# Patient Record
Sex: MALE | Race: WHITE | NOT HISPANIC OR LATINO
[De-identification: names, ages, dates, MRNs, and addresses within clinical notes are randomized per-mention and may not be internally consistent; named-entity substitution may affect disease eponyms.]

---

## 2018-04-16 PROBLEM — Z00.00 ENCOUNTER FOR PREVENTIVE HEALTH EXAMINATION: Status: ACTIVE | Noted: 2018-04-16

## 2018-04-30 ENCOUNTER — APPOINTMENT (OUTPATIENT)
Dept: SURGICAL ONCOLOGY | Facility: CLINIC | Age: 58
End: 2018-04-30
Payer: COMMERCIAL

## 2018-04-30 VITALS
WEIGHT: 241 LBS | HEART RATE: 69 BPM | SYSTOLIC BLOOD PRESSURE: 126 MMHG | BODY MASS INDEX: 34.5 KG/M2 | RESPIRATION RATE: 15 BRPM | OXYGEN SATURATION: 95 % | HEIGHT: 70 IN | DIASTOLIC BLOOD PRESSURE: 74 MMHG

## 2018-04-30 DIAGNOSIS — Z80.41 FAMILY HISTORY OF MALIGNANT NEOPLASM OF OVARY: ICD-10-CM

## 2018-04-30 DIAGNOSIS — Z78.9 OTHER SPECIFIED HEALTH STATUS: ICD-10-CM

## 2018-04-30 PROCEDURE — 99243 OFF/OP CNSLTJ NEW/EST LOW 30: CPT

## 2018-05-01 ENCOUNTER — FORM ENCOUNTER (OUTPATIENT)
Age: 58
End: 2018-05-01

## 2018-05-01 ENCOUNTER — TRANSCRIPTION ENCOUNTER (OUTPATIENT)
Age: 58
End: 2018-05-01

## 2018-05-02 ENCOUNTER — OUTPATIENT (OUTPATIENT)
Dept: OUTPATIENT SERVICES | Facility: HOSPITAL | Age: 58
LOS: 1 days | End: 2018-05-02
Payer: COMMERCIAL

## 2018-05-02 ENCOUNTER — APPOINTMENT (OUTPATIENT)
Dept: CT IMAGING | Facility: CLINIC | Age: 58
End: 2018-05-02
Payer: COMMERCIAL

## 2018-05-02 DIAGNOSIS — C43.59 MALIGNANT MELANOMA OF OTHER PART OF TRUNK: ICD-10-CM

## 2018-05-02 PROCEDURE — 74177 CT ABD & PELVIS W/CONTRAST: CPT | Mod: 26

## 2018-05-02 PROCEDURE — 74177 CT ABD & PELVIS W/CONTRAST: CPT

## 2018-05-02 PROCEDURE — 71260 CT THORAX DX C+: CPT

## 2018-05-02 PROCEDURE — 71260 CT THORAX DX C+: CPT | Mod: 26

## 2018-05-04 ENCOUNTER — RESULT REVIEW (OUTPATIENT)
Age: 58
End: 2018-05-04

## 2018-05-14 ENCOUNTER — OUTPATIENT (OUTPATIENT)
Dept: OUTPATIENT SERVICES | Facility: HOSPITAL | Age: 58
LOS: 1 days | End: 2018-05-14
Payer: COMMERCIAL

## 2018-05-14 DIAGNOSIS — Z01.818 ENCOUNTER FOR OTHER PREPROCEDURAL EXAMINATION: ICD-10-CM

## 2018-05-14 DIAGNOSIS — C43.59 MALIGNANT MELANOMA OF OTHER PART OF TRUNK: ICD-10-CM

## 2018-05-14 PROCEDURE — 93010 ELECTROCARDIOGRAM REPORT: CPT | Mod: NC

## 2018-05-14 PROCEDURE — 93005 ELECTROCARDIOGRAM TRACING: CPT

## 2018-05-14 PROCEDURE — G0463: CPT

## 2018-05-14 PROCEDURE — 85025 COMPLETE CBC W/AUTO DIFF WBC: CPT

## 2018-05-14 PROCEDURE — 80048 BASIC METABOLIC PNL TOTAL CA: CPT

## 2018-05-14 PROCEDURE — 36415 COLL VENOUS BLD VENIPUNCTURE: CPT

## 2018-05-14 PROCEDURE — 83615 LACTATE (LD) (LDH) ENZYME: CPT

## 2018-05-15 ENCOUNTER — APPOINTMENT (OUTPATIENT)
Dept: SURGICAL ONCOLOGY | Facility: CLINIC | Age: 58
End: 2018-05-15
Payer: COMMERCIAL

## 2018-05-15 ENCOUNTER — FORM ENCOUNTER (OUTPATIENT)
Age: 58
End: 2018-05-15

## 2018-05-15 VITALS
WEIGHT: 241 LBS | BODY MASS INDEX: 34.5 KG/M2 | SYSTOLIC BLOOD PRESSURE: 133 MMHG | RESPIRATION RATE: 15 BRPM | HEIGHT: 70 IN | HEART RATE: 71 BPM | DIASTOLIC BLOOD PRESSURE: 81 MMHG

## 2018-05-15 PROCEDURE — 99214 OFFICE O/P EST MOD 30 MIN: CPT

## 2018-05-15 RX ORDER — CEPHALEXIN 500 MG/1
500 CAPSULE ORAL EVERY 8 HOURS
Qty: 30 | Refills: 0 | Status: ACTIVE | COMMUNITY
Start: 2018-05-15 | End: 1900-01-01

## 2018-05-16 ENCOUNTER — APPOINTMENT (OUTPATIENT)
Dept: SURGICAL ONCOLOGY | Facility: HOSPITAL | Age: 58
End: 2018-05-16

## 2018-05-16 ENCOUNTER — RESULT REVIEW (OUTPATIENT)
Age: 58
End: 2018-05-16

## 2018-05-16 ENCOUNTER — OUTPATIENT (OUTPATIENT)
Dept: OUTPATIENT SERVICES | Facility: HOSPITAL | Age: 58
LOS: 1 days | End: 2018-05-16
Payer: COMMERCIAL

## 2018-05-16 DIAGNOSIS — C43.59 MALIGNANT MELANOMA OF OTHER PART OF TRUNK: ICD-10-CM

## 2018-05-16 PROCEDURE — 88305 TISSUE EXAM BY PATHOLOGIST: CPT | Mod: 26

## 2018-05-16 PROCEDURE — 38792 RA TRACER ID OF SENTINL NODE: CPT

## 2018-05-16 PROCEDURE — 14301 TIS TRNFR ANY 30.1-60 SQ CM: CPT

## 2018-05-16 PROCEDURE — 14302 TIS TRNFR ADDL 30 SQ CM: CPT

## 2018-05-16 PROCEDURE — 78195 LYMPH SYSTEM IMAGING: CPT | Mod: 26

## 2018-05-16 PROCEDURE — 11606 EXC TR-EXT MAL+MARG >4 CM: CPT | Mod: 59

## 2018-05-17 PROCEDURE — 78195 LYMPH SYSTEM IMAGING: CPT

## 2018-05-17 PROCEDURE — 88305 TISSUE EXAM BY PATHOLOGIST: CPT

## 2018-05-17 PROCEDURE — 14001 TIS TRNFR TRUNK 10.1-30SQCM: CPT

## 2018-05-17 PROCEDURE — A9541: CPT

## 2018-07-11 ENCOUNTER — APPOINTMENT (OUTPATIENT)
Dept: SURGICAL ONCOLOGY | Facility: CLINIC | Age: 58
End: 2018-07-11
Payer: COMMERCIAL

## 2018-07-11 VITALS
BODY MASS INDEX: 34.36 KG/M2 | DIASTOLIC BLOOD PRESSURE: 74 MMHG | WEIGHT: 240 LBS | OXYGEN SATURATION: 95 % | HEART RATE: 71 BPM | SYSTOLIC BLOOD PRESSURE: 119 MMHG | HEIGHT: 70 IN

## 2018-07-11 PROCEDURE — 99024 POSTOP FOLLOW-UP VISIT: CPT

## 2018-07-11 RX ORDER — DOXYCYCLINE 100 MG/1
100 TABLET, FILM COATED ORAL
Qty: 20 | Refills: 0 | Status: ACTIVE | COMMUNITY
Start: 2018-06-25

## 2018-07-11 RX ORDER — COLLAGENASE SANTYL 250 [ARB'U]/G
250 OINTMENT TOPICAL
Qty: 30 | Refills: 0 | Status: ACTIVE | COMMUNITY
Start: 2018-07-03

## 2018-07-11 RX ORDER — AMOXICILLIN AND CLAVULANATE POTASSIUM 875; 125 MG/1; MG/1
875-125 TABLET, COATED ORAL
Qty: 30 | Refills: 0 | Status: ACTIVE | COMMUNITY
Start: 2018-07-02

## 2018-10-10 ENCOUNTER — FORM ENCOUNTER (OUTPATIENT)
Age: 58
End: 2018-10-10

## 2018-10-11 ENCOUNTER — OUTPATIENT (OUTPATIENT)
Dept: OUTPATIENT SERVICES | Facility: HOSPITAL | Age: 58
LOS: 1 days | End: 2018-10-11
Payer: COMMERCIAL

## 2018-10-11 ENCOUNTER — APPOINTMENT (OUTPATIENT)
Dept: ULTRASOUND IMAGING | Facility: CLINIC | Age: 58
End: 2018-10-11
Payer: COMMERCIAL

## 2018-10-11 DIAGNOSIS — C43.59 MALIGNANT MELANOMA OF OTHER PART OF TRUNK: ICD-10-CM

## 2018-10-11 PROCEDURE — 76882 US LMTD JT/FCL EVL NVASC XTR: CPT

## 2018-10-11 PROCEDURE — 76882 US LMTD JT/FCL EVL NVASC XTR: CPT | Mod: 26,RT

## 2018-10-26 ENCOUNTER — OUTPATIENT (OUTPATIENT)
Dept: OUTPATIENT SERVICES | Facility: HOSPITAL | Age: 58
LOS: 1 days | End: 2018-10-26
Payer: COMMERCIAL

## 2018-10-26 VITALS
HEART RATE: 67 BPM | DIASTOLIC BLOOD PRESSURE: 86 MMHG | HEIGHT: 70 IN | WEIGHT: 244.93 LBS | SYSTOLIC BLOOD PRESSURE: 141 MMHG | TEMPERATURE: 98 F | OXYGEN SATURATION: 97 % | RESPIRATION RATE: 16 BRPM

## 2018-10-26 DIAGNOSIS — R22.1 LOCALIZED SWELLING, MASS AND LUMP, NECK: ICD-10-CM

## 2018-10-26 DIAGNOSIS — C43.9 MALIGNANT MELANOMA OF SKIN, UNSPECIFIED: Chronic | ICD-10-CM

## 2018-10-26 DIAGNOSIS — Z90.49 ACQUIRED ABSENCE OF OTHER SPECIFIED PARTS OF DIGESTIVE TRACT: Chronic | ICD-10-CM

## 2018-10-26 DIAGNOSIS — Z01.818 ENCOUNTER FOR OTHER PREPROCEDURAL EXAMINATION: ICD-10-CM

## 2018-10-26 DIAGNOSIS — M21.70 UNEQUAL LIMB LENGTH (ACQUIRED), UNSPECIFIED SITE: Chronic | ICD-10-CM

## 2018-10-26 LAB
ANION GAP SERPL CALC-SCNC: 11 MMOL/L — SIGNIFICANT CHANGE UP (ref 5–17)
BUN SERPL-MCNC: 16 MG/DL — SIGNIFICANT CHANGE UP (ref 7–23)
CALCIUM SERPL-MCNC: 9.6 MG/DL — SIGNIFICANT CHANGE UP (ref 8.4–10.5)
CHLORIDE SERPL-SCNC: 102 MMOL/L — SIGNIFICANT CHANGE UP (ref 96–108)
CO2 SERPL-SCNC: 26 MMOL/L — SIGNIFICANT CHANGE UP (ref 22–31)
CREAT SERPL-MCNC: 0.7 MG/DL — SIGNIFICANT CHANGE UP (ref 0.5–1.3)
GLUCOSE SERPL-MCNC: 92 MG/DL — SIGNIFICANT CHANGE UP (ref 70–99)
HCT VFR BLD CALC: 45.9 % — SIGNIFICANT CHANGE UP (ref 39–50)
HGB BLD-MCNC: 15.9 G/DL — SIGNIFICANT CHANGE UP (ref 13–17)
MCHC RBC-ENTMCNC: 30.5 PG — SIGNIFICANT CHANGE UP (ref 27–34)
MCHC RBC-ENTMCNC: 34.6 GM/DL — SIGNIFICANT CHANGE UP (ref 32–36)
MCV RBC AUTO: 87.9 FL — SIGNIFICANT CHANGE UP (ref 80–100)
PLATELET # BLD AUTO: 284 K/UL — SIGNIFICANT CHANGE UP (ref 150–400)
POTASSIUM SERPL-MCNC: 4.1 MMOL/L — SIGNIFICANT CHANGE UP (ref 3.5–5.3)
POTASSIUM SERPL-SCNC: 4.1 MMOL/L — SIGNIFICANT CHANGE UP (ref 3.5–5.3)
RBC # BLD: 5.22 M/UL — SIGNIFICANT CHANGE UP (ref 4.2–5.8)
RBC # FLD: 13.3 % — SIGNIFICANT CHANGE UP (ref 10.3–14.5)
SODIUM SERPL-SCNC: 139 MMOL/L — SIGNIFICANT CHANGE UP (ref 135–145)
WBC # BLD: 6.11 K/UL — SIGNIFICANT CHANGE UP (ref 3.8–10.5)
WBC # FLD AUTO: 6.11 K/UL — SIGNIFICANT CHANGE UP (ref 3.8–10.5)

## 2018-10-26 PROCEDURE — 85027 COMPLETE CBC AUTOMATED: CPT

## 2018-10-26 PROCEDURE — G0463: CPT

## 2018-10-26 PROCEDURE — 80048 BASIC METABOLIC PNL TOTAL CA: CPT

## 2018-10-26 RX ORDER — SODIUM CHLORIDE 9 MG/ML
3 INJECTION INTRAMUSCULAR; INTRAVENOUS; SUBCUTANEOUS EVERY 8 HOURS
Qty: 0 | Refills: 0 | Status: DISCONTINUED | OUTPATIENT
Start: 2018-11-01 | End: 2018-11-16

## 2018-10-26 RX ORDER — LIDOCAINE HCL 20 MG/ML
0.2 VIAL (ML) INJECTION ONCE
Qty: 0 | Refills: 0 | Status: DISCONTINUED | OUTPATIENT
Start: 2018-11-01 | End: 2018-11-16

## 2018-10-26 NOTE — H&P PST ADULT - PMH
Gallstones  ' 2000  Leg length discrepancy  ' 70's  surgically treated  Melanoma  ' 2017   Right Chest wall  Neck mass  posterior  Obesity  BMI  35.1  Obstructive sleep apnea syndrome in adult  per criteria

## 2018-10-26 NOTE — H&P PST ADULT - NSANTHOSAYNRD_GEN_A_CORE
Neck:  18.5 in. Neck:  18.5 in./No. CALEB screening performed.  STOP BANG Legend: 0-2 = LOW Risk; 3-4 = INTERMEDIATE Risk; 5-8 = HIGH Risk

## 2018-10-26 NOTE — H&P PST ADULT - ATTENDING COMMENTS
58-year-old man with a soft tissue mass of the posterior neck scheduled for excision for diagnosis and treatment.    Planned management was reviewed with him prior to operation, and again on day of surgery.    All questions answered, consent on chart

## 2018-10-26 NOTE — H&P PST ADULT - PSH
Leg length discrepancy  ' 70's   Multiple Right Leg Lengthenings  Malignant melanoma  ' 2017  Excised Right Chest wall  Status post cholecystectomy  ' 2000

## 2018-10-26 NOTE — H&P PST ADULT - HISTORY OF PRESENT ILLNESS
This is a 59 y/o male with PMH:  obesity: BMI 35.1, CALEB per criteria,  s/p ( '17): Excision Melanoma  Right Chest Wall: negative margins. + Posterior Neck Mass X several years with gradual increased size; denies pain. Scheduled: Excision Neck Mass.

## 2018-10-31 ENCOUNTER — TRANSCRIPTION ENCOUNTER (OUTPATIENT)
Age: 58
End: 2018-10-31

## 2018-10-31 RX ORDER — ONDANSETRON 8 MG/1
4 TABLET, FILM COATED ORAL ONCE
Qty: 0 | Refills: 0 | Status: DISCONTINUED | OUTPATIENT
Start: 2018-11-01 | End: 2018-11-16

## 2018-10-31 RX ORDER — SODIUM CHLORIDE 9 MG/ML
1000 INJECTION, SOLUTION INTRAVENOUS
Qty: 0 | Refills: 0 | Status: DISCONTINUED | OUTPATIENT
Start: 2018-11-01 | End: 2018-11-16

## 2018-10-31 RX ORDER — CELECOXIB 200 MG/1
200 CAPSULE ORAL ONCE
Qty: 0 | Refills: 0 | Status: DISCONTINUED | OUTPATIENT
Start: 2018-11-01 | End: 2018-11-16

## 2018-11-01 ENCOUNTER — OUTPATIENT (OUTPATIENT)
Dept: OUTPATIENT SERVICES | Facility: HOSPITAL | Age: 58
LOS: 1 days | End: 2018-11-01
Payer: COMMERCIAL

## 2018-11-01 ENCOUNTER — RESULT REVIEW (OUTPATIENT)
Age: 58
End: 2018-11-01

## 2018-11-01 ENCOUNTER — APPOINTMENT (OUTPATIENT)
Dept: SURGICAL ONCOLOGY | Facility: HOSPITAL | Age: 58
End: 2018-11-01

## 2018-11-01 VITALS
HEART RATE: 58 BPM | SYSTOLIC BLOOD PRESSURE: 119 MMHG | RESPIRATION RATE: 14 BRPM | TEMPERATURE: 98 F | OXYGEN SATURATION: 98 % | DIASTOLIC BLOOD PRESSURE: 56 MMHG

## 2018-11-01 VITALS
OXYGEN SATURATION: 97 % | DIASTOLIC BLOOD PRESSURE: 75 MMHG | TEMPERATURE: 98 F | HEART RATE: 75 BPM | RESPIRATION RATE: 16 BRPM | WEIGHT: 244.93 LBS | HEIGHT: 70 IN | SYSTOLIC BLOOD PRESSURE: 135 MMHG

## 2018-11-01 DIAGNOSIS — M21.70 UNEQUAL LIMB LENGTH (ACQUIRED), UNSPECIFIED SITE: Chronic | ICD-10-CM

## 2018-11-01 DIAGNOSIS — R22.1 LOCALIZED SWELLING, MASS AND LUMP, NECK: ICD-10-CM

## 2018-11-01 DIAGNOSIS — C43.9 MALIGNANT MELANOMA OF SKIN, UNSPECIFIED: Chronic | ICD-10-CM

## 2018-11-01 DIAGNOSIS — Z90.49 ACQUIRED ABSENCE OF OTHER SPECIFIED PARTS OF DIGESTIVE TRACT: Chronic | ICD-10-CM

## 2018-11-01 PROCEDURE — 21552 EXC NECK LES SC 3 CM/>: CPT

## 2018-11-01 PROCEDURE — 88304 TISSUE EXAM BY PATHOLOGIST: CPT

## 2018-11-01 PROCEDURE — 88304 TISSUE EXAM BY PATHOLOGIST: CPT | Mod: 26

## 2018-11-01 PROCEDURE — 12032 INTMD RPR S/A/T/EXT 2.6-7.5: CPT | Mod: 59

## 2018-11-01 PROCEDURE — 14040 TIS TRNFR F/C/C/M/N/A/G/H/F: CPT

## 2018-11-01 RX ORDER — ACETAMINOPHEN 500 MG
1000 TABLET ORAL ONCE
Qty: 0 | Refills: 0 | Status: COMPLETED | OUTPATIENT
Start: 2018-11-01 | End: 2018-11-01

## 2018-11-01 RX ORDER — APREPITANT 80 MG/1
40 CAPSULE ORAL ONCE
Qty: 0 | Refills: 0 | Status: COMPLETED | OUTPATIENT
Start: 2018-11-01 | End: 2018-11-01

## 2018-11-01 RX ORDER — CELECOXIB 200 MG/1
200 CAPSULE ORAL ONCE
Qty: 0 | Refills: 0 | Status: COMPLETED | OUTPATIENT
Start: 2018-11-01 | End: 2018-11-01

## 2018-11-01 RX ADMIN — APREPITANT 40 MILLIGRAM(S): 80 CAPSULE ORAL at 07:28

## 2018-11-01 RX ADMIN — Medication 1000 MILLIGRAM(S): at 07:28

## 2018-11-01 RX ADMIN — CELECOXIB 200 MILLIGRAM(S): 200 CAPSULE ORAL at 07:28

## 2018-11-01 NOTE — BRIEF OPERATIVE NOTE - PROCEDURE
<<-----Click on this checkbox to enter Procedure Excision  11/01/2018  posterior neck mass  Active  MBEG

## 2018-11-01 NOTE — ASU DISCHARGE PLAN (ADULT/PEDIATRIC). - NOTIFY
Swelling that continues/Numbness, color, or temperature change to extremity/Unable to Urinate/Increased Irritability or Sluggishness/Pain not relieved by Medications/Numbness, tingling/Inability to Tolerate Liquids or Foods/Fever greater than 101/Bleeding that does not stop/Excessive Diarrhea/Persistent Nausea and Vomiting

## 2018-11-01 NOTE — ASU DISCHARGE PLAN (ADULT/PEDIATRIC). - ITEMS TO FOLLOWUP WITH YOUR PHYSICIAN'S
Dr. Fletcher should call with pathology report on November 12, the conversation will determine subsequent management

## 2018-11-01 NOTE — ASU PATIENT PROFILE, ADULT - PMH
Gallstones  ' 2000  Leg length discrepancy  ' 70's  surgically treated  Melanoma  ' 2017   Right Chest wall  Neck mass  posterior  Obesity  BMI  35.1  Obstructive sleep apnea syndrome in adult  per criteria no

## 2018-11-01 NOTE — PRE-ANESTHESIA EVALUATION ADULT - NSANTHOSAYNRD_GEN_A_CORE
No. CALEB screening performed.  STOP BANG Legend: 0-2 = LOW Risk; 3-4 = INTERMEDIATE Risk; 5-8 = HIGH Risk/Neck:  18.5 in.

## 2018-11-02 ENCOUNTER — FORM ENCOUNTER (OUTPATIENT)
Age: 58
End: 2018-11-02

## 2018-11-03 ENCOUNTER — OUTPATIENT (OUTPATIENT)
Dept: OUTPATIENT SERVICES | Facility: HOSPITAL | Age: 58
LOS: 1 days | End: 2018-11-03
Payer: COMMERCIAL

## 2018-11-03 ENCOUNTER — APPOINTMENT (OUTPATIENT)
Dept: CT IMAGING | Facility: CLINIC | Age: 58
End: 2018-11-03
Payer: COMMERCIAL

## 2018-11-03 DIAGNOSIS — C43.59 MALIGNANT MELANOMA OF OTHER PART OF TRUNK: ICD-10-CM

## 2018-11-03 DIAGNOSIS — M21.70 UNEQUAL LIMB LENGTH (ACQUIRED), UNSPECIFIED SITE: Chronic | ICD-10-CM

## 2018-11-03 DIAGNOSIS — Z90.49 ACQUIRED ABSENCE OF OTHER SPECIFIED PARTS OF DIGESTIVE TRACT: Chronic | ICD-10-CM

## 2018-11-03 DIAGNOSIS — C43.9 MALIGNANT MELANOMA OF SKIN, UNSPECIFIED: Chronic | ICD-10-CM

## 2018-11-03 PROBLEM — G47.33 OBSTRUCTIVE SLEEP APNEA (ADULT) (PEDIATRIC): Chronic | Status: ACTIVE | Noted: 2018-10-26

## 2018-11-03 PROBLEM — K80.20 CALCULUS OF GALLBLADDER WITHOUT CHOLECYSTITIS WITHOUT OBSTRUCTION: Chronic | Status: ACTIVE | Noted: 2018-10-26

## 2018-11-03 PROBLEM — R22.1 LOCALIZED SWELLING, MASS AND LUMP, NECK: Chronic | Status: ACTIVE | Noted: 2018-10-26

## 2018-11-03 PROBLEM — E66.9 OBESITY, UNSPECIFIED: Chronic | Status: ACTIVE | Noted: 2018-10-26

## 2018-11-03 PROCEDURE — 71250 CT THORAX DX C-: CPT

## 2018-11-03 PROCEDURE — 71250 CT THORAX DX C-: CPT | Mod: 26

## 2018-11-06 LAB — SURGICAL PATHOLOGY STUDY: SIGNIFICANT CHANGE UP

## 2019-01-14 ENCOUNTER — APPOINTMENT (OUTPATIENT)
Dept: SURGICAL ONCOLOGY | Facility: CLINIC | Age: 59
End: 2019-01-14
Payer: COMMERCIAL

## 2019-01-14 VITALS
HEART RATE: 71 BPM | RESPIRATION RATE: 17 BRPM | BODY MASS INDEX: 36.08 KG/M2 | HEIGHT: 70 IN | SYSTOLIC BLOOD PRESSURE: 145 MMHG | WEIGHT: 252 LBS | DIASTOLIC BLOOD PRESSURE: 89 MMHG

## 2019-01-14 PROCEDURE — 99024 POSTOP FOLLOW-UP VISIT: CPT

## 2019-01-14 NOTE — ASSESSMENT
[FreeTextEntry1] : October 2018 axillary sonogram @[facility] demonstrated no adenopathy.\par Prescription entered for a followup study April 2019.\par \par If unremarkable we will see him in approximately 6 months, sooner if needed.\par CT chest will be repeated In 6 months, May 2019., Prescription and system\par \par \par Clinically doing well\par \par I have asked to see him in another 6 months, sooner if needed

## 2019-01-14 NOTE — PHYSICAL EXAM
[Normal] : supple, no neck mass and thyroid not enlarged [Normal Neck Lymph Nodes] : normal neck lymph nodes  [Normal Supraclavicular Lymph Nodes] : normal supraclavicular lymph nodes [Normal Axillary Lymph Nodes] : normal axillary lymph nodes [Normal] : full range of motion and no deformities appreciated [de-identified] : Groins not examined [de-identified] : Below

## 2019-01-14 NOTE — REASON FOR VISIT
[Follow-Up Visit] : a follow-up visit for [Other: _____] : [unfilled] [FreeTextEntry2] : Right chest melanoma

## 2019-01-14 NOTE — HISTORY OF PRESENT ILLNESS
[de-identified] : 58 year-old man who May 2018 had wide excision of a 2.5 mm melanoma with a mitotic index of 5 from the right chest with negative margins, and primary closure.\par NO SLN SEEN on preoperative scintigraphy.\par \par 2018 he had excision of a sebaceous cyst on the posterior neck\par \par During childhood he had several right lower extremity limb lengthening procedures in [place], [facility], and then [place].\par Shortly after his operation, there was wound breakdown associated with one of the scars on the right ankle which is being treated at the wound care center in [place], with favorable progress.\par \par \par \par May 2018 he was referred by his dermatologist Dr. Adiel Otto 2018 with the recent diagnosis of a 2.5 mm melanoma of the right chest with a mitotic index of 5.\par \par A preoperative CT scan of the chest, May 3, 2018, demonstrated no evidence of metastatic disease\par 2018 followup CT chest at [facility] demonstrated stable bilateral tiny pulmonary nodules.\par Will repeat in  6 months, May 2019..- Prescription in system\par \par The primary lesion was asymptomatic pigmented lesion discovered on routine dermatologic surveillance\par \par There is no previous personal history of melanoma.\par There is no prior personal history of skin cancer.\par \par +FH:\par His father had melanoma.\par His father also had prostate cancer.\par \par His mother  of ovarian cancer.\par There are no other relatives with history of malignancy.\par \par His dermatologist is Dr. Adiel Otto.\par 2018 visit was unremarkable\par \par Eye examination: .2018 with Dr. Salazar was unremarkable\par \par His internist is Dr. Mayito Meyer.\par He does not have a pacemaker or defibrillator.\par He does not take any anticoagulants.\par He takes no prescription medications.\par \par Colonoscopy with Dr. Truong Rangel\par Consultation was 2018\par A lower endoscopy was 2018, and unremarkable

## 2019-05-13 ENCOUNTER — FORM ENCOUNTER (OUTPATIENT)
Age: 59
End: 2019-05-13

## 2019-05-14 ENCOUNTER — OUTPATIENT (OUTPATIENT)
Dept: OUTPATIENT SERVICES | Facility: HOSPITAL | Age: 59
LOS: 1 days | End: 2019-05-14
Payer: COMMERCIAL

## 2019-05-14 ENCOUNTER — APPOINTMENT (OUTPATIENT)
Dept: CT IMAGING | Facility: CLINIC | Age: 59
End: 2019-05-14

## 2019-05-14 ENCOUNTER — APPOINTMENT (OUTPATIENT)
Dept: ULTRASOUND IMAGING | Facility: CLINIC | Age: 59
End: 2019-05-14

## 2019-05-14 DIAGNOSIS — C43.59 MALIGNANT MELANOMA OF OTHER PART OF TRUNK: ICD-10-CM

## 2019-05-14 DIAGNOSIS — Z90.49 ACQUIRED ABSENCE OF OTHER SPECIFIED PARTS OF DIGESTIVE TRACT: Chronic | ICD-10-CM

## 2019-05-14 DIAGNOSIS — C43.9 MALIGNANT MELANOMA OF SKIN, UNSPECIFIED: Chronic | ICD-10-CM

## 2019-05-14 DIAGNOSIS — M21.70 UNEQUAL LIMB LENGTH (ACQUIRED), UNSPECIFIED SITE: Chronic | ICD-10-CM

## 2019-05-14 DIAGNOSIS — Z00.8 ENCOUNTER FOR OTHER GENERAL EXAMINATION: ICD-10-CM

## 2019-05-14 PROCEDURE — 76882 US LMTD JT/FCL EVL NVASC XTR: CPT | Mod: 26,RT

## 2019-05-14 PROCEDURE — 76882 US LMTD JT/FCL EVL NVASC XTR: CPT

## 2019-05-14 PROCEDURE — 71250 CT THORAX DX C-: CPT | Mod: 26

## 2019-05-14 PROCEDURE — 71250 CT THORAX DX C-: CPT

## 2019-07-18 ENCOUNTER — APPOINTMENT (OUTPATIENT)
Dept: SURGICAL ONCOLOGY | Facility: CLINIC | Age: 59
End: 2019-07-18
Payer: COMMERCIAL

## 2019-07-18 ENCOUNTER — TRANSCRIPTION ENCOUNTER (OUTPATIENT)
Age: 59
End: 2019-07-18

## 2019-07-18 VITALS
SYSTOLIC BLOOD PRESSURE: 134 MMHG | BODY MASS INDEX: 33.79 KG/M2 | HEART RATE: 56 BPM | WEIGHT: 236 LBS | DIASTOLIC BLOOD PRESSURE: 81 MMHG | OXYGEN SATURATION: 96 % | HEIGHT: 70 IN

## 2019-07-18 PROCEDURE — 99214 OFFICE O/P EST MOD 30 MIN: CPT

## 2019-07-19 NOTE — PHYSICAL EXAM
[Normal] : supple, no neck mass and thyroid not enlarged [Normal Neck Lymph Nodes] : normal neck lymph nodes  [Normal Supraclavicular Lymph Nodes] : normal supraclavicular lymph nodes [Normal Axillary Lymph Nodes] : normal axillary lymph nodes [Normal] : full range of motion and no deformities appreciated [de-identified] : Groins not examined [de-identified] : Below

## 2019-07-19 NOTE — ASSESSMENT
[FreeTextEntry1] : May 2019 CT chest at [facility], stable bilateral pulmonary nodules.\par Accompanying right axillary ultrasound, no adenopathy.\par \par Prescriptions entered to have the studies repeated and a 6 month interval, November 2019.\par \par Asymptomatic, with normal imaging, we will see him in another 6 months, sooner if needed

## 2019-07-19 NOTE — HISTORY OF PRESENT ILLNESS
[de-identified] : 59-year-old man who May 2018 head wide excision of a right chest 2.5 mm melanoma (mitotic index of 5), with negative margins, and primary closure..\par NO SLN SEEN on preoperative scintigraphy.\par \par 2018 he had excision of a sebaceous cyst on the posterior Right neck\par \par During childhood he had several right lower extremity limb lengthening procedures in [place], [facility], and then [place].\par Shortly after his operation, there was wound breakdown associated with one of the scars on the right ankle which is being treated at the wound care center in [place], with favorable progress.\par \par \par \par May 2018 he was referred by his dermatologist Dr. Adiel BEY with a 2.5 mm melanoma of the right chest with a mitotic index of 5.\par \par A preoperative CT scan of the chest, May 3, 2018, demonstrated no evidence of metastatic disease\par 2018 followup CT chest at [facility] demonstrated stable bilateral tiny pulmonary nodules.\par Will repeat in  6 months, May 2019..- No interval changes\par \par The primary lesion was asymptomatic pigmented lesion discovered on routine dermatologic surveillance\par \par No previous personal history of melanoma.\par \par No prior personal history of skin cancer.\par \par +FH:\par His father had melanoma.\par His father also had prostate cancer.\par \par His mother  of ovarian cancer.\par There are no other relatives with history of malignancy.\par \par His dermatologist is Dr. Adiel BEY: 2019 appointment is scheduled\par \par Eye examination: .2018 with Dr. Salazar was unremarkable\par \par His internist is Dr. Mayito MADDOX.\par He does not have a pacemaker or defibrillator.\par He does not take any anticoagulants.\par He takes no prescription medications.\par \par Colonoscopy with Dr. Truong Rangel 2018, unremarkable

## 2019-11-18 ENCOUNTER — FORM ENCOUNTER (OUTPATIENT)
Age: 59
End: 2019-11-18

## 2019-11-19 ENCOUNTER — OUTPATIENT (OUTPATIENT)
Dept: OUTPATIENT SERVICES | Facility: HOSPITAL | Age: 59
LOS: 1 days | End: 2019-11-19
Payer: COMMERCIAL

## 2019-11-19 ENCOUNTER — APPOINTMENT (OUTPATIENT)
Dept: CT IMAGING | Facility: CLINIC | Age: 59
End: 2019-11-19
Payer: COMMERCIAL

## 2019-11-19 ENCOUNTER — APPOINTMENT (OUTPATIENT)
Dept: ULTRASOUND IMAGING | Facility: CLINIC | Age: 59
End: 2019-11-19
Payer: COMMERCIAL

## 2019-11-19 DIAGNOSIS — M21.70 UNEQUAL LIMB LENGTH (ACQUIRED), UNSPECIFIED SITE: Chronic | ICD-10-CM

## 2019-11-19 DIAGNOSIS — Z90.49 ACQUIRED ABSENCE OF OTHER SPECIFIED PARTS OF DIGESTIVE TRACT: Chronic | ICD-10-CM

## 2019-11-19 DIAGNOSIS — Z00.00 ENCOUNTER FOR GENERAL ADULT MEDICAL EXAMINATION WITHOUT ABNORMAL FINDINGS: ICD-10-CM

## 2019-11-19 DIAGNOSIS — C43.9 MALIGNANT MELANOMA OF SKIN, UNSPECIFIED: Chronic | ICD-10-CM

## 2019-11-19 DIAGNOSIS — C43.59 MALIGNANT MELANOMA OF OTHER PART OF TRUNK: ICD-10-CM

## 2019-11-19 PROCEDURE — 76882 US LMTD JT/FCL EVL NVASC XTR: CPT

## 2019-11-19 PROCEDURE — 71250 CT THORAX DX C-: CPT

## 2019-11-19 PROCEDURE — 71250 CT THORAX DX C-: CPT | Mod: 26

## 2019-11-19 PROCEDURE — 76882 US LMTD JT/FCL EVL NVASC XTR: CPT | Mod: 26,RT

## 2020-01-16 ENCOUNTER — APPOINTMENT (OUTPATIENT)
Dept: SURGICAL ONCOLOGY | Facility: CLINIC | Age: 60
End: 2020-01-16
Payer: COMMERCIAL

## 2020-01-16 VITALS
SYSTOLIC BLOOD PRESSURE: 124 MMHG | HEIGHT: 70 IN | DIASTOLIC BLOOD PRESSURE: 75 MMHG | WEIGHT: 239 LBS | HEART RATE: 62 BPM | RESPIRATION RATE: 15 BRPM | BODY MASS INDEX: 34.22 KG/M2

## 2020-01-16 PROCEDURE — 99213 OFFICE O/P EST LOW 20 MIN: CPT

## 2020-01-17 NOTE — PHYSICAL EXAM
[Normal] : supple, no neck mass and thyroid not enlarged [Normal Supraclavicular Lymph Nodes] : normal supraclavicular lymph nodes [Normal Neck Lymph Nodes] : normal neck lymph nodes  [Normal Axillary Lymph Nodes] : normal axillary lymph nodes [Normal] : full range of motion and no deformities appreciated [de-identified] : Groins not examined [de-identified] : Below

## 2020-01-17 NOTE — HISTORY OF PRESENT ILLNESS
[de-identified] : 59 year-old man who May 2018 head wide excision of a RIGHT CHEST 2.5 mm melanoma (mitotic index of 5), with negative margins, and primary closure..\par NO SLN SEEN on preoperative scintigraphy.\par \par 2018 he had excision of a sebaceous cyst on the posterior Right neck\par \par During childhood he had several right lower extremity limb lengthening procedures in [place], [facility], and then [place].\par Shortly after his operation, there was wound breakdown associated with one of the scars on the right ankle which is being treated at the wound care center in [place], with favorable progress.\par \par \par \par May 2018 he was referred by his dermatologist Dr. Adiel BEY with a 2.5 mm melanoma of the right chest with a mitotic index of 5.\par \par A preoperative CT scan of the chest, May 3, 2018, demonstrated no evidence of metastatic disease\par 2018 followup CT chest at [facility] demonstrated stable bilateral tiny pulmonary nodules.\par May 2019..- No interval changes\par \par The primary lesion was asymptomatic pigmented lesion discovered on routine dermatologic surveillance\par \par No previous personal history of melanoma.\par \par No prior personal history of skin cancer.\par \par +FH:\par His father had melanoma.\par His father also had prostate cancer.\par \par His mother  of ovarian cancer.\par There are no other relatives with history of malignancy.\par \par His dermatologist is Dr. Adiel BEY: 2019 visit was unremarkable\par \par Eye examination: .2018 with Dr. Salazar was unremarkable\par \par His internist is Dr. Mayito MADDOX.\par \par He does not have a pacemaker or defibrillator.\par He does not take any anticoagulants.\par \par He takes no prescription medications.\par \par Colonoscopy with Dr. Truong Rangel 2018, unremarkable

## 2020-01-17 NOTE — ASSESSMENT
[FreeTextEntry1] : November 2019 CT chest at [facility], stable bilateral pulmonary nodules.\par Accompanying right axillary ultrasound, no adenopathy.\par \par Will repeat right axillary sonogram in 6 months, May 2020, prescription entered today.\par We'll repeat CT chest annually, next a November 2020.........................\par \par If asymptomatic, with normal imaging, we will see him in another 6 months, sooner if needed\par \par (He brought attention to a raised, smooth, nonpigmented lesion on the inner aspect of the right knee which is irritated when putting on her taking off his pants.\par He will show to his dermatologist.\par He will contact me if he would like me to excise it.)

## 2020-02-20 NOTE — H&P PST ADULT - DENTITION
normal O-L Flap Text: The defect edges were debeveled with a #15 scalpel blade.  Given the location of the defect, shape of the defect and the proximity to free margins an O-L flap was deemed most appropriate.  Using a sterile surgical marker, an appropriate advancement flap was drawn incorporating the defect and placing the expected incisions within the relaxed skin tension lines where possible.    The area thus outlined was incised deep to adipose tissue with a #15 scalpel blade.  The skin margins were undermined to an appropriate distance in all directions utilizing iris scissors.

## 2020-05-19 ENCOUNTER — APPOINTMENT (OUTPATIENT)
Dept: ULTRASOUND IMAGING | Facility: CLINIC | Age: 60
End: 2020-05-19
Payer: COMMERCIAL

## 2020-05-19 ENCOUNTER — RESULT REVIEW (OUTPATIENT)
Age: 60
End: 2020-05-19

## 2020-05-19 ENCOUNTER — OUTPATIENT (OUTPATIENT)
Dept: OUTPATIENT SERVICES | Facility: HOSPITAL | Age: 60
LOS: 1 days | End: 2020-05-19
Payer: COMMERCIAL

## 2020-05-19 DIAGNOSIS — Z90.49 ACQUIRED ABSENCE OF OTHER SPECIFIED PARTS OF DIGESTIVE TRACT: Chronic | ICD-10-CM

## 2020-05-19 DIAGNOSIS — C43.59 MALIGNANT MELANOMA OF OTHER PART OF TRUNK: ICD-10-CM

## 2020-05-19 DIAGNOSIS — M21.70 UNEQUAL LIMB LENGTH (ACQUIRED), UNSPECIFIED SITE: Chronic | ICD-10-CM

## 2020-05-19 DIAGNOSIS — C43.9 MALIGNANT MELANOMA OF SKIN, UNSPECIFIED: Chronic | ICD-10-CM

## 2020-05-19 PROCEDURE — 76882 US LMTD JT/FCL EVL NVASC XTR: CPT | Mod: 26,RT

## 2020-05-19 PROCEDURE — 76882 US LMTD JT/FCL EVL NVASC XTR: CPT

## 2020-08-12 NOTE — HISTORY OF PRESENT ILLNESS
[de-identified] : 60 year-old man who May 2018 head wide excision of a RIGHT CHEST 2.5 mm melanoma (mitotic index of 5), with negative margins, and primary closure..\par NO SLN SEEN on preoperative scintigraphy.\par \par 2018 he had excision of a sebaceous cyst on the posterior Right neck\par \par During childhood he had several right lower extremity limb lengthening procedures in [place], [facility], and then [place].\par Shortly after his operation, there was wound breakdown associated with one of the scars on the right ankle which is being treated at the wound care center in [place], with favorable progress.\par \par \par \par May 2018 he was referred by his dermatologist Dr. Adiel BEY with a 2.5 mm melanoma of the right chest with a mitotic index of 5.\par \par A preoperative CT scan of the chest, May 3, 2018, demonstrated no evidence of metastatic disease\par 2018 followup CT chest at [facility] demonstrated stable bilateral tiny pulmonary nodules.\par May 2019..- No interval changes\par \par The primary lesion was asymptomatic pigmented lesion discovered on routine dermatologic surveillance\par \par No previous personal history of melanoma.\par \par No prior personal history of skin cancer.\par \par +FH:\par His father had melanoma.\par His father also had prostate cancer.\par \par His mother  of ovarian cancer.\par There are no other relatives with history of malignancy.\par \par \par His dermatologist is Dr. Adeil BEY: 2019 visit was unremarkable\par \par \par Eye examination: .2018 with Dr. Salazar was unremarkable\par \par \par His internist is Dr. Mayito MADDOX.\par \par He does not have a pacemaker or defibrillator.\par He does not take any anticoagulants.\par \par He takes no prescription medications.\par \par \par Colonoscopy with Dr. Truong Rangel 2018, unremarkable

## 2020-08-12 NOTE — ASSESSMENT
[FreeTextEntry1] : May 2020 right axillary sonogram: Decrease in size of right axillary node.\par We will repeat in another 6 months (November 2019) since no sentinel drainage on scintigraphy.\par \par We will also do his annual CT chest at that time.\par \par Prescriptions for BOTH studies entered today.\par \par \par If asymptomatic, with normal imaging, we will see him in another 6 months, sooner if needed\par \par (January 2020, he brought attention to a raised, smooth, nonpigmented lesion on the inner aspect of the right knee which is irritated when putting on her taking off his pants.\par He will show to his dermatologist.\par He will contact me if he would like me to excise it.)

## 2020-08-12 NOTE — REVIEW OF SYSTEMS
[Negative] : Endocrine [de-identified] : Melanoma [de-identified] : Congenital unequal limbs, surgically corrected

## 2020-08-12 NOTE — PHYSICAL EXAM
[Normal] : supple, no neck mass and thyroid not enlarged [Normal Neck Lymph Nodes] : normal neck lymph nodes  [Normal Supraclavicular Lymph Nodes] : normal supraclavicular lymph nodes [Normal Axillary Lymph Nodes] : normal axillary lymph nodes [de-identified] : Groins not examined [Normal] : full range of motion and no deformities appreciated [de-identified] : Below

## 2020-08-17 ENCOUNTER — APPOINTMENT (OUTPATIENT)
Dept: SURGICAL ONCOLOGY | Facility: CLINIC | Age: 60
End: 2020-08-17
Payer: COMMERCIAL

## 2020-11-05 ENCOUNTER — APPOINTMENT (OUTPATIENT)
Dept: SURGICAL ONCOLOGY | Facility: CLINIC | Age: 60
End: 2020-11-05
Payer: COMMERCIAL

## 2020-11-05 VITALS
HEIGHT: 70 IN | HEART RATE: 64 BPM | DIASTOLIC BLOOD PRESSURE: 85 MMHG | SYSTOLIC BLOOD PRESSURE: 137 MMHG | BODY MASS INDEX: 34.93 KG/M2 | WEIGHT: 244 LBS | OXYGEN SATURATION: 96 % | RESPIRATION RATE: 15 BRPM

## 2020-11-05 PROCEDURE — 99214 OFFICE O/P EST MOD 30 MIN: CPT

## 2020-11-05 PROCEDURE — 99072 ADDL SUPL MATRL&STAF TM PHE: CPT

## 2020-11-23 ENCOUNTER — RESULT REVIEW (OUTPATIENT)
Age: 60
End: 2020-11-23

## 2020-11-23 ENCOUNTER — APPOINTMENT (OUTPATIENT)
Dept: CT IMAGING | Facility: CLINIC | Age: 60
End: 2020-11-23
Payer: COMMERCIAL

## 2020-11-23 ENCOUNTER — OUTPATIENT (OUTPATIENT)
Dept: OUTPATIENT SERVICES | Facility: HOSPITAL | Age: 60
LOS: 1 days | End: 2020-11-23
Payer: COMMERCIAL

## 2020-11-23 ENCOUNTER — APPOINTMENT (OUTPATIENT)
Dept: ULTRASOUND IMAGING | Facility: CLINIC | Age: 60
End: 2020-11-23
Payer: COMMERCIAL

## 2020-11-23 DIAGNOSIS — C43.59 MALIGNANT MELANOMA OF OTHER PART OF TRUNK: ICD-10-CM

## 2020-11-23 DIAGNOSIS — Z90.49 ACQUIRED ABSENCE OF OTHER SPECIFIED PARTS OF DIGESTIVE TRACT: Chronic | ICD-10-CM

## 2020-11-23 DIAGNOSIS — M21.70 UNEQUAL LIMB LENGTH (ACQUIRED), UNSPECIFIED SITE: Chronic | ICD-10-CM

## 2020-11-23 DIAGNOSIS — C43.9 MALIGNANT MELANOMA OF SKIN, UNSPECIFIED: Chronic | ICD-10-CM

## 2020-11-23 PROCEDURE — 71250 CT THORAX DX C-: CPT | Mod: 26

## 2020-11-23 PROCEDURE — 71250 CT THORAX DX C-: CPT

## 2020-11-23 PROCEDURE — 76882 US LMTD JT/FCL EVL NVASC XTR: CPT | Mod: 26,RT

## 2020-11-23 PROCEDURE — 76882 US LMTD JT/FCL EVL NVASC XTR: CPT

## 2020-11-23 NOTE — ASSESSMENT
[FreeTextEntry1] : May 2020 right axillary sonogram: Decrease in size of right axillary node.\par We will repeat in another 6 months (November 2020) since no sentinel drainage on scintigraphy.\par \par Will perform annual CT chest at that time.\par \par Prescriptions for BOTH studies verified today.\par \par \par If asymptomatic, with normal imaging, we will see him in another year, sooner if needed\par \par \par 11-23-20:\par Imaging at [facility]:\par 1.  Right axillary sonogram: No adenopathy.\par 2.  CT chest: No change since 2018.\par \par

## 2020-11-23 NOTE — HISTORY OF PRESENT ILLNESS
[de-identified] : 60 year-old man who May 2018 had wide excision of a RIGHT CHEST 2.5 mm melanoma (mitotic index of 5), with negative margins, and primary closure..\par NO SLN SEEN on preoperative scintigraphy.\par \par 2018 he had excision of a sebaceous cyst on the posterior Right neck\par \par During childhood he had several right lower extremity limb lengthening procedures in [place], [facility], and then [place].\par Shortly after his operation, there was wound breakdown associated  right ankle scars which was treated at the wound care center in [place], with favorable progress.\par \par \par May 2018 he was referred by his dermatologist Dr. Adiel BEY with a 2.5 mm melanoma of the right chest with a mitotic index of 5.\par \par A preoperative CT scan of the chest, May 3, 2018, demonstrated no evidence of metastatic disease\par 2018 followup CT chest at [facility]: STABLE bilateral tiny pulmonary nodules.\par May 2019..- NO interval changes\par \par The primary lesion was asymptomatic pigmented lesion discovered on routine dermatologic surveillance\par \par No previous personal history of melanoma.\par \par No prior personal history of skin cancer.\par \par +FH:\par His father had melanoma.\par His father also had prostate cancer.\par \par His mother  of ovarian cancer.\par There are no other relatives with history of malignancy.\par \par \par His dermatologist is Dr. Adiel BEY: 2019 visit was unremarkable.\par He was unable to go for a follow-up visit since his insurance changed.\par He has been unable to see a new dermatologist because of the coronavirus pandemic.\par He will either see Dr. Rita Summers, or Dr. Amari Burnham.\par \par \par His internist is Dr. Mayito MADDOX.\par \par He does not have a pacemaker or defibrillator.\par He does not take any anticoagulants.\par \par He takes no prescription medications.\par \par \par Eye examination: .2019 with Dr. Salazar was unremarkable\par \par \par Colonoscopy with Dr. Truong Rangel 2018, unremarkable

## 2020-11-23 NOTE — REASON FOR VISIT
[Follow-Up Visit] : a follow-up visit for [Other: _____] : [unfilled] [FreeTextEntry2] : T3 melanoma right chest

## 2020-11-23 NOTE — PHYSICAL EXAM
[Normal] : supple, no neck mass and thyroid not enlarged [Normal Neck Lymph Nodes] : normal neck lymph nodes  [Normal Supraclavicular Lymph Nodes] : normal supraclavicular lymph nodes [Normal Axillary Lymph Nodes] : normal axillary lymph nodes [Normal] : full range of motion and no deformities appreciated [de-identified] : Groins not examined [de-identified] : Below

## 2021-05-13 ENCOUNTER — APPOINTMENT (OUTPATIENT)
Dept: SURGICAL ONCOLOGY | Facility: CLINIC | Age: 61
End: 2021-05-13
Payer: COMMERCIAL

## 2021-05-13 VITALS
RESPIRATION RATE: 16 BRPM | SYSTOLIC BLOOD PRESSURE: 123 MMHG | HEIGHT: 70 IN | BODY MASS INDEX: 35.5 KG/M2 | OXYGEN SATURATION: 97 % | DIASTOLIC BLOOD PRESSURE: 84 MMHG | TEMPERATURE: 98 F | WEIGHT: 248 LBS | HEART RATE: 65 BPM

## 2021-05-13 PROCEDURE — 99214 OFFICE O/P EST MOD 30 MIN: CPT

## 2021-05-13 PROCEDURE — 99072 ADDL SUPL MATRL&STAF TM PHE: CPT

## 2021-05-13 NOTE — HISTORY OF PRESENT ILLNESS
[de-identified] : 61 year-old man who May 2018 had wide excision of a RIGHT CHEST 2.5 mm melanoma (mitotic index = 5), with negative margins, and primary closure..\par NO SLN SEEN on preoperative scintigraphy.\par \par 2018 he had excision of a sebaceous cyst on the posterior Right neck.\par \par Today he presents with a 4 to 6-month history of a new, asymptomatic, palpable lump in the posterior midline of his neck.\par As described in his examination below, this is separate from the incision from the excision of a sebaceous cyst described above.\par No other complaints presently\par \par During childhood he had several right lower extremity limb lengthening procedures in [place], [facility], and then [place].\par Shortly after his operation, there was wound breakdown associated  right ankle scars which was treated at the wound care center in [place], with favorable progress.\par \par \par May 2018 he was referred by his dermatologist Dr. Adiel BEY with a 2.5 mm melanoma of the right chest with a mitotic index of 5.\par \par A preoperative CT scan of the chest, May 3, 2018, demonstrated no evidence of metastatic disease\par 2018 followup CT chest at [facility]: STABLE bilateral tiny pulmonary nodules.\par May 2019..- NO interval changes\par \par The primary lesion was asymptomatic pigmented lesion discovered on routine dermatologic surveillance\par \par No previous personal history of melanoma.\par \par No prior personal history of skin cancer.\par \par +FH:\par His father had melanoma.\par His father also had prostate cancer.\par \par His mother  of ovarian cancer.\par There are no other relatives with history of malignancy.\par \par \par His dermatologist is Dr. Adiel Bey: \par 2019 visit was unremarkable.\par \par He was unable to go for a follow-up visit since his insurance changed.\par He has been unable to see a new dermatologist because of the coronavirus pandemic.\par He admitted that he has been remiss in scheduling a visit, but will do so\par \par He will either see Dr. Rita Summers, or Dr. Amari Burnham.\par \par \par His internist is Dr. Mayito MADDOX.\par \par He does not have a pacemaker or defibrillator.\par He does not take any anticoagulants.\par \par He takes no prescription medications.\par \par \par Eye examination: .2019 with Dr. Salazar was unremarkable\par \par \par Colonoscopy with Dr. Truong Rangel 2018, unremarkable

## 2021-05-13 NOTE — PHYSICAL EXAM
[Normal] : supple, no neck mass and thyroid not enlarged [Normal Neck Lymph Nodes] : normal neck lymph nodes  [Normal Supraclavicular Lymph Nodes] : normal supraclavicular lymph nodes [Normal Axillary Lymph Nodes] : normal axillary lymph nodes [Normal] : full range of motion and no deformities appreciated [de-identified] : Groins not examined [de-identified] : Below

## 2021-05-13 NOTE — REASON FOR VISIT
[Follow-Up Visit] : a follow-up visit for [Other: _____] : [unfilled] [FreeTextEntry2] : T3 melanoma of the right chest

## 2021-05-13 NOTE — ASSESSMENT
[FreeTextEntry1] : Clinically doing well.\par \par To further assess the new posterior mass in the midline of his neck I am arranging for an ultrasound.\par The study will be in conjunction with his axillary sonogram later this month.\par We will speak after that has been done.\par \par \par 11-23-20:\par Imaging at [facility]:\par 1.  Right axillary sonogram: No adenopathy.\par 2.  CT chest: No change since 2018.\par \par We will repeat a right axillary sonogram presently, prescription entered.\par NO sentinel node was identified on scintigraphy.\par \par \par If asymptomatic, with normal imaging, we will see him in another year, sooner if needed\par \par

## 2021-05-20 ENCOUNTER — APPOINTMENT (OUTPATIENT)
Dept: ULTRASOUND IMAGING | Facility: CLINIC | Age: 61
End: 2021-05-20
Payer: COMMERCIAL

## 2021-05-20 ENCOUNTER — OUTPATIENT (OUTPATIENT)
Dept: OUTPATIENT SERVICES | Facility: HOSPITAL | Age: 61
LOS: 1 days | End: 2021-05-20
Payer: COMMERCIAL

## 2021-05-20 DIAGNOSIS — C43.9 MALIGNANT MELANOMA OF SKIN, UNSPECIFIED: Chronic | ICD-10-CM

## 2021-05-20 DIAGNOSIS — C43.59 MALIGNANT MELANOMA OF OTHER PART OF TRUNK: ICD-10-CM

## 2021-05-20 DIAGNOSIS — Z90.49 ACQUIRED ABSENCE OF OTHER SPECIFIED PARTS OF DIGESTIVE TRACT: Chronic | ICD-10-CM

## 2021-05-20 DIAGNOSIS — M21.70 UNEQUAL LIMB LENGTH (ACQUIRED), UNSPECIFIED SITE: Chronic | ICD-10-CM

## 2021-05-20 PROCEDURE — 76882 US LMTD JT/FCL EVL NVASC XTR: CPT

## 2021-05-20 PROCEDURE — 76882 US LMTD JT/FCL EVL NVASC XTR: CPT | Mod: 26,RT

## 2021-10-19 ENCOUNTER — NON-APPOINTMENT (OUTPATIENT)
Age: 61
End: 2021-10-19

## 2021-12-02 ENCOUNTER — APPOINTMENT (OUTPATIENT)
Dept: SURGICAL ONCOLOGY | Facility: CLINIC | Age: 61
End: 2021-12-02
Payer: COMMERCIAL

## 2021-12-02 VITALS
WEIGHT: 245 LBS | SYSTOLIC BLOOD PRESSURE: 127 MMHG | DIASTOLIC BLOOD PRESSURE: 75 MMHG | OXYGEN SATURATION: 94 % | BODY MASS INDEX: 35.07 KG/M2 | TEMPERATURE: 97.1 F | HEART RATE: 69 BPM | HEIGHT: 70 IN | RESPIRATION RATE: 15 BRPM

## 2021-12-02 PROCEDURE — 99214 OFFICE O/P EST MOD 30 MIN: CPT

## 2021-12-02 NOTE — REASON FOR VISIT
[Follow-Up Visit] : a follow-up visit for [Other: _____] : [unfilled] [FreeTextEntry2] : T3 melanoma, right chest

## 2021-12-02 NOTE — PHYSICAL EXAM
[Normal] : supple, no neck mass and thyroid not enlarged [Normal Neck Lymph Nodes] : normal neck lymph nodes  [Normal Supraclavicular Lymph Nodes] : normal supraclavicular lymph nodes [Normal Axillary Lymph Nodes] : normal axillary lymph nodes [Normal] : full range of motion and no deformities appreciated [de-identified] : Groins not examined [de-identified] : Below

## 2021-12-02 NOTE — HISTORY OF PRESENT ILLNESS
[de-identified] : 61 year-old man who May 2018 had wide excision of a RIGHT CHEST 2.5 mm melanoma (mitotic index = 5), with negative margins, and primary closure..\par NO SLN SEEN on preoperative scintigraphy.\par \par 2018 he had excision of a sebaceous cyst on the posterior Right neck.\par \par \par May 2021:\par He presented with a 4 to 6-month history of a new, asymptomatic, palpable lump in the posterior midline of his neck.\par This was separate from the incision from the excision of a sebaceous cyst described above.  \par Clinically not worrisome, measuring ~3 cm, and subcutaneous.\par 2021 sonogram of this area at , coordinated by his dermatologist, demonstrated benign "cyst"\par \par \par During childhood he had several right lower extremity limb lengthening procedures in [place], [facility], and then [place].\par Shortly after his operation, there was wound breakdown associated  right ankle scars which was treated at the wound care center in [place], with some progress.\par \par \par May 2018 he was referred by his dermatologist Dr. Adiel BEY with a 2.5 mm melanoma of the right chest with a mitotic index of 5.\par \par A preoperative CT scan of the chest, May 3, 2018, demonstrated no evidence of metastatic disease\par 2018 followup CT chest at [facility]: STABLE bilateral tiny pulmonary nodules.\par May 2019..- NO interval changes\par \par The primary lesion was asymptomatic pigmented lesion discovered on routine dermatologic surveillance\par \par No previous personal history of melanoma.\par \par No prior personal history of skin cancer.\par \par +FH:\par His father had melanoma.\par His father also had prostate cancer.\par \par His mother  of ovarian cancer.\par There are no other relatives with history of malignancy.\par \par \par His dermatologist was Dr. Adiel Bey: \par 2019 visit was unremarkable.\par \par He was unable to go for a follow-up visit since his insurance changed.\par He was unable to see a new dermatologist because of the coronavirus pandemic.\par Summer 2021 he had a dermatologic evaluation with Dr. Rita RIGGS which was unremarkable.\par \par \par His internist is Dr. Mayito MADDOX.\par \par He does not have a pacemaker or defibrillator.\par He does not take any anticoagulants.\par \par He takes no prescription medications.\par \par \par Eye examination: .2019 with Dr. Salazar was unremarkable.\par I suggested a follow-up visit\par \par \par Colonoscopy with Dr. Truong Rangel 2018, unremarkable

## 2021-12-02 NOTE — ASSESSMENT
[FreeTextEntry1] : Clinically doing well.\par \par \par Presently due for his annual CT chest, prescription entered.\par \par He is also due for his right axillary sonogram which is performed since no sentinel node was identified on scintigraphy.\par Prescription entered.\par \par Lastly, he never had his neck ultrasound to evaluate the soft tissue mass in the posterior neck, so that prescription was entered today also.\par \par If asymptomatic, with normal imaging, we should see him in another year, sooner if needed\par \par

## 2022-01-11 ENCOUNTER — OUTPATIENT (OUTPATIENT)
Dept: OUTPATIENT SERVICES | Facility: HOSPITAL | Age: 62
LOS: 1 days | End: 2022-01-11
Payer: COMMERCIAL

## 2022-01-11 ENCOUNTER — APPOINTMENT (OUTPATIENT)
Dept: ULTRASOUND IMAGING | Facility: CLINIC | Age: 62
End: 2022-01-11
Payer: COMMERCIAL

## 2022-01-11 ENCOUNTER — APPOINTMENT (OUTPATIENT)
Dept: CT IMAGING | Facility: CLINIC | Age: 62
End: 2022-01-11
Payer: COMMERCIAL

## 2022-01-11 DIAGNOSIS — C43.9 MALIGNANT MELANOMA OF SKIN, UNSPECIFIED: Chronic | ICD-10-CM

## 2022-01-11 DIAGNOSIS — Z90.49 ACQUIRED ABSENCE OF OTHER SPECIFIED PARTS OF DIGESTIVE TRACT: Chronic | ICD-10-CM

## 2022-01-11 DIAGNOSIS — Z00.8 ENCOUNTER FOR OTHER GENERAL EXAMINATION: ICD-10-CM

## 2022-01-11 DIAGNOSIS — M21.70 UNEQUAL LIMB LENGTH (ACQUIRED), UNSPECIFIED SITE: Chronic | ICD-10-CM

## 2022-01-11 DIAGNOSIS — C43.59 MALIGNANT MELANOMA OF OTHER PART OF TRUNK: ICD-10-CM

## 2022-01-11 PROCEDURE — 71250 CT THORAX DX C-: CPT

## 2022-01-11 PROCEDURE — 76882 US LMTD JT/FCL EVL NVASC XTR: CPT

## 2022-01-11 PROCEDURE — 76882 US LMTD JT/FCL EVL NVASC XTR: CPT | Mod: 26,RT

## 2022-01-11 PROCEDURE — 71250 CT THORAX DX C-: CPT | Mod: 26

## 2022-12-08 ENCOUNTER — APPOINTMENT (OUTPATIENT)
Dept: SURGICAL ONCOLOGY | Facility: CLINIC | Age: 62
End: 2022-12-08

## 2022-12-08 VITALS
TEMPERATURE: 97.5 F | HEIGHT: 70 IN | SYSTOLIC BLOOD PRESSURE: 127 MMHG | WEIGHT: 219 LBS | OXYGEN SATURATION: 96 % | DIASTOLIC BLOOD PRESSURE: 78 MMHG | RESPIRATION RATE: 16 BRPM | HEART RATE: 65 BPM | BODY MASS INDEX: 31.35 KG/M2

## 2022-12-08 PROCEDURE — 99214 OFFICE O/P EST MOD 30 MIN: CPT

## 2022-12-08 RX ORDER — TADALAFIL 5 MG/1
5 TABLET ORAL
Qty: 30 | Refills: 0 | Status: ACTIVE | COMMUNITY
Start: 2022-10-14

## 2022-12-08 RX ORDER — COLCHICINE 0.6 MG/1
0.6 TABLET ORAL
Qty: 30 | Refills: 0 | Status: ACTIVE | COMMUNITY
Start: 2022-10-14

## 2022-12-08 RX ORDER — PREDNISONE 10 MG/1
10 TABLET ORAL
Qty: 23 | Refills: 0 | Status: ACTIVE | COMMUNITY
Start: 2022-08-10

## 2022-12-08 RX ORDER — ALLOPURINOL 100 MG/1
100 TABLET ORAL
Qty: 30 | Refills: 0 | Status: ACTIVE | COMMUNITY
Start: 2022-10-14

## 2022-12-08 NOTE — HISTORY OF PRESENT ILLNESS
[de-identified] : 62 year-old man.\par \par May 2018:\par Wide excision of a RIGHT CHEST 2.5 mm melanoma (mitotic index = 5), with negative margins, and primary closure..\par NO SLN SEEN on preoperative scintigraphy.\par \par 2018 he had excision of a sebaceous cyst on the posterior Right neck.\par \par \par May 2021:\par He presented with a 4 to 6-month history of a new, asymptomatic, palpable lump in the posterior midline of his neck.\par This was separate from the incision from the excision of a sebaceous cyst described above.  \par Clinically not worrisome, measuring ~3 cm, and subcutaneous.\par 2021 sonogram of this area at , coordinated by his dermatologist, demonstrated benign "cyst".\par \par \par Today (2022):\par 1.  He thinks the lump on the posterior neck may be getting larger.\par On examination (below) I share his impression.\par A prescription for a targeted ultrasound of the posterior neck was entered today.\par 2.  He had a lump on his right lower back that had become larger and painful, then regressed by itself.\par On my exam a punctum is visible, along with a small amount of residual induration, suggesting that this may be an inflamed or infected epidermal inclusion cyst.\par \par No other specific or constitutional signs or symptoms.\par \par \par During childhood he had several right lower extremity limb lengthening procedures in [place], [facility], and then [place].\par Shortly after his operation, there was wound breakdown associated  right ankle scars which was treated at the wound care center in [place], with some progress.\par \par \par May 2018 he was referred by his dermatologist Dr. Adiel BEY with a 2.5 mm melanoma of the right chest with a mitotic index of 5.\par \par A preoperative CT scan of the chest, May 3, 2018, demonstrated no evidence of metastatic disease\par 2018 followup CT chest at [facility]: STABLE bilateral tiny pulmonary nodules.\par May 2019..- NO interval changes\par \par The primary lesion was asymptomatic pigmented lesion discovered on routine dermatologic surveillance\par \par No previous personal history of melanoma.\par \par No prior personal history of skin cancer.\par \par +FH:\par His father had melanoma.\par His father also had prostate cancer.\par \par His mother  of ovarian cancer.\par There are no other relatives with history of malignancy.\par \par \par Derm: Dr. Rita RIGGS.\par Summer 2022 visit was unremarkable.\par \par Used to see Dr. Adiel Bey.\par \par \par His internist is Dr. Mayito MADDOX.\par \par He does not have a pacemaker or defibrillator.\par He does not take any anticoagulants.\par \par + Gout.\par Treated by his internist, with allopurinol.\par He has not had to see a rheumatologist.\par \par \par Eye examination: .2019 with Dr. Salazar was unremarkable.\par I suggested a follow-up visit, again today (2022\par \par \par Colonoscopy with Dr. Truong Rangel 2018, unremarkable

## 2022-12-08 NOTE — PHYSICAL EXAM
[Normal] : supple, no neck mass and thyroid not enlarged [Normal Neck Lymph Nodes] : normal neck lymph nodes  [Normal Supraclavicular Lymph Nodes] : normal supraclavicular lymph nodes [Normal Axillary Lymph Nodes] : normal axillary lymph nodes [Normal] : full range of motion and no deformities appreciated [de-identified] : Groins not examined [de-identified] : Below

## 2022-12-08 NOTE — ASSESSMENT
[FreeTextEntry1] : Regarding the soft tissue mass of the posterior neck, a repeat neck ultrasound has been ordered.\par \par \par Presently due for his annual CT chest, prescription entered.\par \par Include a prescription for the right axilla to have an ultrasound examination since no sentinel drainage identified on scintigraphy.\par \par He and I should speak after the above imaging has been completed.\par \par If the mass is larger, interventional be warranted.\par Stable mass could be considered for resection of that is his preference.\par \par Reviewed in detail, all questions answered.\par \par If asymptomatic, with normal imaging, we should see him in another year, sooner if needed\par \par

## 2022-12-08 NOTE — REVIEW OF SYSTEMS
[Negative] : Heme/Lymph [de-identified] : hx of limb lengthening surgery [de-identified] : melanoma

## 2022-12-08 NOTE — REASON FOR VISIT
[Follow-Up Visit] : a follow-up visit for [Other: _____] : [unfilled] [FreeTextEntry2] : Right chest T3 melanoma

## 2023-01-18 ENCOUNTER — APPOINTMENT (OUTPATIENT)
Dept: ULTRASOUND IMAGING | Facility: CLINIC | Age: 63
End: 2023-01-18
Payer: COMMERCIAL

## 2023-01-18 ENCOUNTER — OUTPATIENT (OUTPATIENT)
Dept: OUTPATIENT SERVICES | Facility: HOSPITAL | Age: 63
LOS: 1 days | End: 2023-01-18
Payer: COMMERCIAL

## 2023-01-18 ENCOUNTER — APPOINTMENT (OUTPATIENT)
Dept: CT IMAGING | Facility: CLINIC | Age: 63
End: 2023-01-18
Payer: COMMERCIAL

## 2023-01-18 DIAGNOSIS — Z90.49 ACQUIRED ABSENCE OF OTHER SPECIFIED PARTS OF DIGESTIVE TRACT: Chronic | ICD-10-CM

## 2023-01-18 DIAGNOSIS — R22.1 LOCALIZED SWELLING, MASS AND LUMP, NECK: ICD-10-CM

## 2023-01-18 DIAGNOSIS — M21.70 UNEQUAL LIMB LENGTH (ACQUIRED), UNSPECIFIED SITE: Chronic | ICD-10-CM

## 2023-01-18 DIAGNOSIS — C43.9 MALIGNANT MELANOMA OF SKIN, UNSPECIFIED: Chronic | ICD-10-CM

## 2023-01-18 DIAGNOSIS — Z00.8 ENCOUNTER FOR OTHER GENERAL EXAMINATION: ICD-10-CM

## 2023-01-18 DIAGNOSIS — C43.59 MALIGNANT MELANOMA OF OTHER PART OF TRUNK: ICD-10-CM

## 2023-01-18 PROCEDURE — 76882 US LMTD JT/FCL EVL NVASC XTR: CPT | Mod: 26,RT

## 2023-01-18 PROCEDURE — 71250 CT THORAX DX C-: CPT | Mod: 26

## 2023-01-18 PROCEDURE — 71250 CT THORAX DX C-: CPT

## 2023-01-18 PROCEDURE — 76536 US EXAM OF HEAD AND NECK: CPT | Mod: 26

## 2023-01-18 PROCEDURE — 76536 US EXAM OF HEAD AND NECK: CPT

## 2023-01-18 PROCEDURE — 76882 US LMTD JT/FCL EVL NVASC XTR: CPT

## 2023-08-10 ENCOUNTER — APPOINTMENT (OUTPATIENT)
Dept: SURGICAL ONCOLOGY | Facility: CLINIC | Age: 63
End: 2023-08-10
Payer: COMMERCIAL

## 2023-08-10 VITALS
OXYGEN SATURATION: 99 % | HEIGHT: 70 IN | BODY MASS INDEX: 29.78 KG/M2 | HEART RATE: 61 BPM | DIASTOLIC BLOOD PRESSURE: 87 MMHG | SYSTOLIC BLOOD PRESSURE: 143 MMHG | WEIGHT: 208 LBS | RESPIRATION RATE: 17 BRPM

## 2023-08-10 DIAGNOSIS — D23.9 OTHER BENIGN NEOPLASM OF SKIN, UNSPECIFIED: ICD-10-CM

## 2023-08-10 PROCEDURE — 99215 OFFICE O/P EST HI 40 MIN: CPT

## 2023-08-10 NOTE — REASON FOR VISIT
[Follow-Up Visit] : a follow-up visit for [Other: _____] : [unfilled] [FreeTextEntry2] : Newly diagnosed dysplastic nevus of the right back, and enlarging soft tissue mass of the posterior neck, history of T3 melanoma of the right chest

## 2023-08-10 NOTE — HISTORY OF PRESENT ILLNESS
[de-identified] : 63 year-old man.  Recently diagnosed dysplastic nevus of the upper mid back. This was an asymptomatic pigmented lesion identified on routine, semiannual, dermatologic evaluation.  + Enlarging, soft tissue mass of the posterior neck which she would like excised also.  May 2018: Wide excision of a RIGHT CHEST 2.5 mm melanoma (mitotic index = 5), with negative margins, and primary closure.. NO SLN SEEN on preoperative scintigraphy.  2018 he had excision of a sebaceous cyst on the posterior Right neck.   May 2021: He presented with a 4 to 6-month history of a new, asymptomatic, palpable lump in the posterior midline of his neck. This was separate from the incision from the excision of a sebaceous cyst described above.   Clinically not worrisome, measuring ~3 cm, and subcutaneous. 2021 sonogram of this area at , coordinated by his dermatologist, demonstrated benign "cyst".   (2022): 1.  He thinks the lump on the posterior neck may be getting larger. On examination (below) I shared his impression. A prescription for a targeted ultrasound of the posterior neck was entered today. 2.  He had a lump on his right lower back that had become larger and painful, then regressed by itself. On my exam a punctum is visible, along with a small amount of residual induration, suggesting that this may be an inflamed or infected epidermal inclusion cyst.  No other specific or constitutional signs or symptoms.   During childhood he had several right lower extremity limb lengthening procedures in [place], [facility], and then [place]. Shortly after his operation, there was wound breakdown associated  right ankle scars which was treated at the wound care center in [place], with some progress.   May 2018 he was referred by his dermatologist Dr. Adiel BEY with a 2.5 mm melanoma of the right chest with a mitotic index of 5.  A preoperative CT scan of the chest, May 3, 2018, demonstrated no evidence of metastatic disease 2018 followup CT chest at [facility]: STABLE bilateral tiny pulmonary nodules. May 2019..- NO interval changes  The primary lesion was asymptomatic pigmented lesion discovered on routine dermatologic surveillance  No previous personal history of melanoma.  No prior personal history of skin cancer.  +FH: His father had melanoma. His father also had prostate cancer.  His mother  of ovarian cancer. There are no other relatives with history of malignancy.   Derm: Dr. Jd CLEMONS  Used to see Dr. Adiel Bey, then Dr. Rita Summers.   His internist is Dr. Mayito MADDOX.  He does not have a pacemaker or defibrillator. He does not take any anticoagulants.  + Gout. Treated by his internist, with allopurinol. He has not had to see a rheumatologist.  + Heartburn. He takes over-the-counter Nexium   Eye examination: 2022 with Dr. Salazar was unremarkable. I suggested a follow-up visit, again today (2022   Colonoscopy with Dr. Truong Rangel 2018, unremarkable. I suggested a follow-up visit

## 2023-08-10 NOTE — ASSESSMENT
[FreeTextEntry1] : 63-year-old man.  History of T3 melanoma of the right chest. No evidence of local or regional recurrence.  Newly diagnosed dysplastic nevus of the upper mid back. Indications and technique for excision reviewed with him, along with the risk, benefits, alternatives, possible surgical outcomes and he would like to proceed with resection.  He would also like me to remove an enlarging soft tissue mass of the posterior neck.  Will coordinate with plastic surgery.  Paperwork for surgical scheduling submitted

## 2023-08-25 ENCOUNTER — APPOINTMENT (OUTPATIENT)
Dept: SURGICAL ONCOLOGY | Facility: AMBULATORY SURGERY CENTER | Age: 63
End: 2023-08-25

## 2023-09-27 ENCOUNTER — OUTPATIENT (OUTPATIENT)
Dept: OUTPATIENT SERVICES | Facility: HOSPITAL | Age: 63
LOS: 1 days | End: 2023-09-27

## 2023-09-27 VITALS
HEIGHT: 69 IN | WEIGHT: 210.98 LBS | OXYGEN SATURATION: 98 % | HEART RATE: 64 BPM | TEMPERATURE: 98 F | SYSTOLIC BLOOD PRESSURE: 111 MMHG | DIASTOLIC BLOOD PRESSURE: 74 MMHG | RESPIRATION RATE: 15 BRPM

## 2023-09-27 DIAGNOSIS — Z90.49 ACQUIRED ABSENCE OF OTHER SPECIFIED PARTS OF DIGESTIVE TRACT: Chronic | ICD-10-CM

## 2023-09-27 DIAGNOSIS — Z98.890 OTHER SPECIFIED POSTPROCEDURAL STATES: Chronic | ICD-10-CM

## 2023-09-27 DIAGNOSIS — M21.70 UNEQUAL LIMB LENGTH (ACQUIRED), UNSPECIFIED SITE: Chronic | ICD-10-CM

## 2023-09-27 DIAGNOSIS — Z91.89 OTHER SPECIFIED PERSONAL RISK FACTORS, NOT ELSEWHERE CLASSIFIED: ICD-10-CM

## 2023-09-27 DIAGNOSIS — D23.9 OTHER BENIGN NEOPLASM OF SKIN, UNSPECIFIED: ICD-10-CM

## 2023-09-27 DIAGNOSIS — R22.1 LOCALIZED SWELLING, MASS AND LUMP, NECK: ICD-10-CM

## 2023-09-27 DIAGNOSIS — C43.9 MALIGNANT MELANOMA OF SKIN, UNSPECIFIED: Chronic | ICD-10-CM

## 2023-09-27 DIAGNOSIS — M10.9 GOUT, UNSPECIFIED: ICD-10-CM

## 2023-09-27 LAB
ALBUMIN SERPL ELPH-MCNC: 4.3 G/DL — SIGNIFICANT CHANGE UP (ref 3.3–5)
ALP SERPL-CCNC: 65 U/L — SIGNIFICANT CHANGE UP (ref 40–120)
ALT FLD-CCNC: 15 U/L — SIGNIFICANT CHANGE UP (ref 4–41)
ANION GAP SERPL CALC-SCNC: 14 MMOL/L — SIGNIFICANT CHANGE UP (ref 7–14)
AST SERPL-CCNC: 16 U/L — SIGNIFICANT CHANGE UP (ref 4–40)
BILIRUB SERPL-MCNC: 0.5 MG/DL — SIGNIFICANT CHANGE UP (ref 0.2–1.2)
BLD GP AB SCN SERPL QL: NEGATIVE — SIGNIFICANT CHANGE UP
BUN SERPL-MCNC: 14 MG/DL — SIGNIFICANT CHANGE UP (ref 7–23)
CALCIUM SERPL-MCNC: 9.5 MG/DL — SIGNIFICANT CHANGE UP (ref 8.4–10.5)
CHLORIDE SERPL-SCNC: 99 MMOL/L — SIGNIFICANT CHANGE UP (ref 98–107)
CO2 SERPL-SCNC: 27 MMOL/L — SIGNIFICANT CHANGE UP (ref 22–31)
CREAT SERPL-MCNC: 0.64 MG/DL — SIGNIFICANT CHANGE UP (ref 0.5–1.3)
EGFR: 107 ML/MIN/1.73M2 — SIGNIFICANT CHANGE UP
GLUCOSE SERPL-MCNC: 85 MG/DL — SIGNIFICANT CHANGE UP (ref 70–99)
HCT VFR BLD CALC: 43.1 % — SIGNIFICANT CHANGE UP (ref 39–50)
HGB BLD-MCNC: 14.3 G/DL — SIGNIFICANT CHANGE UP (ref 13–17)
MCHC RBC-ENTMCNC: 29.5 PG — SIGNIFICANT CHANGE UP (ref 27–34)
MCHC RBC-ENTMCNC: 33.2 GM/DL — SIGNIFICANT CHANGE UP (ref 32–36)
MCV RBC AUTO: 89 FL — SIGNIFICANT CHANGE UP (ref 80–100)
NRBC # BLD: 0 /100 WBCS — SIGNIFICANT CHANGE UP (ref 0–0)
NRBC # FLD: 0 K/UL — SIGNIFICANT CHANGE UP (ref 0–0)
PLATELET # BLD AUTO: 290 K/UL — SIGNIFICANT CHANGE UP (ref 150–400)
POTASSIUM SERPL-MCNC: 3.8 MMOL/L — SIGNIFICANT CHANGE UP (ref 3.5–5.3)
POTASSIUM SERPL-SCNC: 3.8 MMOL/L — SIGNIFICANT CHANGE UP (ref 3.5–5.3)
PROT SERPL-MCNC: 6.7 G/DL — SIGNIFICANT CHANGE UP (ref 6–8.3)
RBC # BLD: 4.84 M/UL — SIGNIFICANT CHANGE UP (ref 4.2–5.8)
RBC # FLD: 12.5 % — SIGNIFICANT CHANGE UP (ref 10.3–14.5)
RH IG SCN BLD-IMP: POSITIVE — SIGNIFICANT CHANGE UP
SODIUM SERPL-SCNC: 140 MMOL/L — SIGNIFICANT CHANGE UP (ref 135–145)
WBC # BLD: 5.41 K/UL — SIGNIFICANT CHANGE UP (ref 3.8–10.5)
WBC # FLD AUTO: 5.41 K/UL — SIGNIFICANT CHANGE UP (ref 3.8–10.5)

## 2023-09-27 NOTE — H&P PST ADULT - EKG AND INTERPRETATION
Patient refused to have EKG at [facility], as per patient he had EKG with PCP on 09/25/23-  called office x2 to be faxed- didn't receive EKG yet.

## 2023-09-27 NOTE — H&P PST ADULT - HISTORY OF PRESENT ILLNESS
63 year old male, presents to PST, with pre op diagnosis of localized swelling mass and lump neck, for pre op evaluation prior to scheduled surgery- Resection  posterior neck mass and wide excision of lesion right upper back with Dr Fletcher.

## 2023-09-27 NOTE — H&P PST ADULT - MUSCULOSKELETAL COMMENTS
patient has PMH of multiple leg lengthening surgeries for right leg in past- c/o of back pain occasionally patient with PMH of right limb lengthening surgery in the past- mild limping present

## 2023-09-27 NOTE — H&P PST ADULT - NSICDXPASTSURGICALHX_GEN_ALL_CORE_FT
PAST SURGICAL HISTORY:  History of ankle surgery     Leg length discrepancy ' 70's   Multiple Right Leg Lengthenings    Malignant melanoma ' 2017  Excised Right Chest wall    S/P excision of lipoma     Status post cholecystectomy ' 2000

## 2023-09-27 NOTE — H&P PST ADULT - NSANTHOSAYNRD_GEN_A_CORE
Neck:  18.5 in.patient denies any diagnosis of sleep apnea/No. CALEB screening performed.  STOP BANG Legend: 0-2 = LOW Risk; 3-4 = INTERMEDIATE Risk; 5-8 = HIGH Risk

## 2023-09-27 NOTE — H&P PST ADULT - NSICDXPASTMEDICALHX_GEN_ALL_CORE_FT
PAST MEDICAL HISTORY:  Gallstones ' 2000    Leg length discrepancy ' 70's  surgically treated    Melanoma ' 2017   Right Chest wall    Neck mass posterior    Obesity BMI  35.1    Obstructive sleep apnea syndrome in adult per criteria

## 2023-09-27 NOTE — H&P PST ADULT - PROBLEM SELECTOR PLAN 1
Patient is tentatively scheduled for surgery- Resection  posterior neck mass and wide excision of lesion right upper back with Dr Fletcher on 10/05/23.    Pre-op instructions provided. Pt given verbal and written instructions with teach back on chlorhexidine wash and pepcid. Pt verbalized understanding with return demonstration.    CBC,BMP,T&S sent  Awaiting EKG and ECHO results from PCP

## 2023-09-27 NOTE — H&P PST ADULT - ATTENDING COMMENTS
62-year-old male with an enlarging soft tissue mass in the posterior left neck.    He is scheduled for excision, with plastics closure (Dr. Gaeg Harris).    oncologic diagnosis, surgical approach, risks, benefits and possible surgical outcomes reviewed in detail, all questions answered.    Consent on chart 62-year-old male with an enlarging soft tissue mass in the posterior left neck, and a dysplastic melanocytic nevus of the upper mid back.    He is scheduled for excisions, with plastics closures (Dr. Gage Harris).    oncologic diagnosis, surgical approach, risks, benefits and possible surgical outcomes reviewed in detail, all questions answered.    Consent on chart

## 2023-09-30 ENCOUNTER — OUTPATIENT (OUTPATIENT)
Dept: OUTPATIENT SERVICES | Facility: HOSPITAL | Age: 63
LOS: 1 days | End: 2023-09-30
Payer: COMMERCIAL

## 2023-09-30 ENCOUNTER — RESULT REVIEW (OUTPATIENT)
Age: 63
End: 2023-09-30

## 2023-09-30 DIAGNOSIS — Z90.49 ACQUIRED ABSENCE OF OTHER SPECIFIED PARTS OF DIGESTIVE TRACT: Chronic | ICD-10-CM

## 2023-09-30 DIAGNOSIS — M21.70 UNEQUAL LIMB LENGTH (ACQUIRED), UNSPECIFIED SITE: Chronic | ICD-10-CM

## 2023-09-30 DIAGNOSIS — C80.1 MALIGNANT (PRIMARY) NEOPLASM, UNSPECIFIED: ICD-10-CM

## 2023-09-30 DIAGNOSIS — Z98.890 OTHER SPECIFIED POSTPROCEDURAL STATES: Chronic | ICD-10-CM

## 2023-09-30 DIAGNOSIS — C43.9 MALIGNANT MELANOMA OF SKIN, UNSPECIFIED: Chronic | ICD-10-CM

## 2023-09-30 PROCEDURE — 88342 IMHCHEM/IMCYTCHM 1ST ANTB: CPT | Mod: 26,59

## 2023-09-30 PROCEDURE — 88341 IMHCHEM/IMCYTCHM EA ADD ANTB: CPT | Mod: 26

## 2023-09-30 PROCEDURE — 88342 IMHCHEM/IMCYTCHM 1ST ANTB: CPT

## 2023-09-30 PROCEDURE — 88323 CONSLTJ&REPRT MATRL PREP SLD: CPT | Mod: 26

## 2023-09-30 PROCEDURE — 88341 IMHCHEM/IMCYTCHM EA ADD ANTB: CPT

## 2023-09-30 PROCEDURE — 88323 CONSLTJ&REPRT MATRL PREP SLD: CPT

## 2023-10-04 ENCOUNTER — TRANSCRIPTION ENCOUNTER (OUTPATIENT)
Age: 63
End: 2023-10-04

## 2023-10-04 NOTE — ASU PATIENT PROFILE, ADULT - FALL HARM RISK - UNIVERSAL INTERVENTIONS
Bed in lowest position, wheels locked, appropriate side rails in place/Call bell, personal items and telephone in reach/Instruct patient to call for assistance before getting out of bed or chair/Non-slip footwear when patient is out of bed/[place] to call system/Physically safe environment - no spills, clutter or unnecessary equipment/Purposeful Proactive Rounding/Room/bathroom lighting operational, light cord in reach

## 2023-10-05 ENCOUNTER — OUTPATIENT (OUTPATIENT)
Dept: OUTPATIENT SERVICES | Facility: HOSPITAL | Age: 63
LOS: 1 days | Discharge: HOME | End: 2023-10-05
Payer: COMMERCIAL

## 2023-10-05 ENCOUNTER — APPOINTMENT (OUTPATIENT)
Dept: PLASTIC SURGERY | Facility: HOSPITAL | Age: 63
End: 2023-10-05
Payer: COMMERCIAL

## 2023-10-05 ENCOUNTER — TRANSCRIPTION ENCOUNTER (OUTPATIENT)
Age: 63
End: 2023-10-05

## 2023-10-05 ENCOUNTER — RESULT REVIEW (OUTPATIENT)
Age: 63
End: 2023-10-05

## 2023-10-05 ENCOUNTER — APPOINTMENT (OUTPATIENT)
Dept: SURGICAL ONCOLOGY | Facility: HOSPITAL | Age: 63
End: 2023-10-05
Payer: COMMERCIAL

## 2023-10-05 VITALS
SYSTOLIC BLOOD PRESSURE: 121 MMHG | WEIGHT: 210.98 LBS | OXYGEN SATURATION: 96 % | DIASTOLIC BLOOD PRESSURE: 67 MMHG | HEART RATE: 66 BPM | RESPIRATION RATE: 16 BRPM | HEIGHT: 69 IN | TEMPERATURE: 98 F

## 2023-10-05 VITALS
RESPIRATION RATE: 18 BRPM | SYSTOLIC BLOOD PRESSURE: 114 MMHG | DIASTOLIC BLOOD PRESSURE: 62 MMHG | HEART RATE: 65 BPM | OXYGEN SATURATION: 97 %

## 2023-10-05 DIAGNOSIS — Z98.890 OTHER SPECIFIED POSTPROCEDURAL STATES: Chronic | ICD-10-CM

## 2023-10-05 DIAGNOSIS — R22.1 LOCALIZED SWELLING, MASS AND LUMP, NECK: ICD-10-CM

## 2023-10-05 DIAGNOSIS — M21.70 UNEQUAL LIMB LENGTH (ACQUIRED), UNSPECIFIED SITE: Chronic | ICD-10-CM

## 2023-10-05 DIAGNOSIS — Z90.49 ACQUIRED ABSENCE OF OTHER SPECIFIED PARTS OF DIGESTIVE TRACT: Chronic | ICD-10-CM

## 2023-10-05 PROCEDURE — 88305 TISSUE EXAM BY PATHOLOGIST: CPT | Mod: 26

## 2023-10-05 PROCEDURE — 11603 EXC TR-EXT MAL+MARG 2.1-3 CM: CPT

## 2023-10-05 PROCEDURE — 15004 WOUND PREP F/N/HF/G: CPT

## 2023-10-05 PROCEDURE — 21552 EXC NECK LES SC 3 CM/>: CPT

## 2023-10-05 PROCEDURE — 15002 WOUND PREP TRK/ARM/LEG: CPT

## 2023-10-05 PROCEDURE — 14301 TIS TRNFR ANY 30.1-60 SQ CM: CPT

## 2023-10-05 PROCEDURE — 14302 TIS TRNFR ADDL 30 SQ CM: CPT

## 2023-10-05 RX ORDER — FENTANYL CITRATE 50 UG/ML
50 INJECTION INTRAVENOUS
Refills: 0 | Status: DISCONTINUED | OUTPATIENT
Start: 2023-10-05 | End: 2023-10-05

## 2023-10-05 RX ORDER — ACETAMINOPHEN 500 MG
3 TABLET ORAL
Qty: 0 | Refills: 0 | DISCHARGE
Start: 2023-10-05

## 2023-10-05 RX ORDER — SODIUM CHLORIDE 9 MG/ML
1000 INJECTION, SOLUTION INTRAVENOUS
Refills: 0 | Status: DISCONTINUED | OUTPATIENT
Start: 2023-10-05 | End: 2023-10-19

## 2023-10-05 RX ORDER — FENTANYL CITRATE 50 UG/ML
25 INJECTION INTRAVENOUS
Refills: 0 | Status: DISCONTINUED | OUTPATIENT
Start: 2023-10-05 | End: 2023-10-05

## 2023-10-05 RX ORDER — ACETAMINOPHEN 500 MG
975 TABLET ORAL EVERY 6 HOURS
Refills: 0 | Status: DISCONTINUED | OUTPATIENT
Start: 2023-10-05 | End: 2023-10-19

## 2023-10-05 RX ORDER — ALLOPURINOL 300 MG
1 TABLET ORAL
Refills: 0 | DISCHARGE

## 2023-10-05 RX ORDER — IBUPROFEN 200 MG
1 TABLET ORAL
Refills: 0 | DISCHARGE

## 2023-10-05 RX ORDER — ESOMEPRAZOLE MAGNESIUM 40 MG/1
0 CAPSULE, DELAYED RELEASE ORAL
Refills: 0 | DISCHARGE

## 2023-10-05 RX ORDER — ONDANSETRON 8 MG/1
4 TABLET, FILM COATED ORAL ONCE
Refills: 0 | Status: DISCONTINUED | OUTPATIENT
Start: 2023-10-05 | End: 2023-10-19

## 2023-10-05 NOTE — BRIEF OPERATIVE NOTE - NSICDXBRIEFPOSTOP_GEN_ALL_CORE_FT
POST-OP DIAGNOSIS:  Dysplastic nevus of skin 05-Oct-2023 11:13:11  Rick Deras  Lipoma of neck 05-Oct-2023 11:13:17  Rick Deras

## 2023-10-05 NOTE — ASU DISCHARGE PLAN (ADULT/PEDIATRIC) - PROVIDER TOKENS
PROVIDER:[TOKEN:[[id number]],FOLLOWUP:[1 week]],PROVIDER:[TOKEN:[[id number]],FOLLOWUP:[2 weeks]]

## 2023-10-05 NOTE — BRIEF OPERATIVE NOTE - NSICDXBRIEFOPLAUNCH_GEN_ALL_CORE
Erica final report in media for your review.    Please advise on what to say to pt. No FV. Thank you.      To: KAITLYNN  
<--- Click to Launch ICDx for PreOp, PostOp and Procedure
<--- Click to Launch ICDx for PreOp, PostOp and Procedure

## 2023-10-05 NOTE — ASU DISCHARGE PLAN (ADULT/PEDIATRIC) - FOLLOW UP APPOINTMENTS
[facility], [facility] May also call Recovery Room (PACU) 24/7 @ [phone]/[facility], [facility]

## 2023-10-05 NOTE — BRIEF OPERATIVE NOTE - OPERATION/FINDINGS
Enlarging soft tissue mass of the posterior left neck excised with clinically negative margins and plastics closure Enlarging soft tissue mass of the posterior left neck excised with clinically negative margins and plastics closure.    Wide excision of  a dysplastic nevus of the upper mid back with plastics closure

## 2023-10-05 NOTE — ASU DISCHARGE PLAN (ADULT/PEDIATRIC) - NS MD DC FALL RISK RISK
For information on Fall & Injury Prevention, visit: [email or web address] OR  [email or web address] OR  https://www.cdc.gov/steadi/patient.html

## 2023-10-05 NOTE — ASU DISCHARGE PLAN (ADULT/PEDIATRIC) - ASU DC SPECIAL INSTRUCTIONSFT
Initial followup with plastic surgery in the next 5-15 days, regarding matters of bandages, activities, and showering.    Dr. Fletcher should call with pathology report in approximately 2 weeks.  The conversation will determine further management. keep dressing on and dry for 48 hours, after, may shower, but keep dressings on until follow-up     Take tylenol/ibuprofen as needed for pain and follow instructions on the bottle.    Follow-up next week in office with Dr. Steven Fletcher should call with pathology report in approximately 2 weeks.  The conversation will determine further management. keep dressing on and dry for 48 hours, after, may shower, but keep replacing with clean, dry dressings until follow-up     Take tylenol/ibuprofen as needed for pain and follow instructions on the bottle.    Follow-up next week in office with Dr. Steven Fletcher should call with pathology report in approximately 2 weeks.  The conversation will determine further management.

## 2023-10-05 NOTE — ASU DISCHARGE PLAN (ADULT/PEDIATRIC) - CARE PROVIDER_API CALL
Gage Harris  Plastic Surgery  [address] [place] [address], [address]  [place] [address]  Phone: [phone]  Fax: [phone]  Follow Up Time: 1 week    Yunier Fletcher  Surgery  [address] [place] [id number]  Phone: [phone]  Fax: [phone]  Follow Up Time: 2 weeks

## 2023-10-05 NOTE — ASU DISCHARGE PLAN (ADULT/PEDIATRIC) - PROCEDURE
Excision of an enlarging lump from the back of the neck (left side) with plastics closure. Wide excision of a dysplastic nevus on the upper mid back, and Excision of an enlarging lump from the back of the neck (left side) with plastics closure.

## 2023-10-05 NOTE — ASU DISCHARGE PLAN (ADULT/PEDIATRIC) - DO NOT SUBMERGE DURATION DAY(S)
keep dressing dry and intact for 48 hours. after, may shower, but keep dressing keep dressing dry and intact for 48 hours. after, may remove top dressings to shower. Dress wound after showering until follow up.

## 2023-10-05 NOTE — BRIEF OPERATIVE NOTE - NSICDXBRIEFPREOP_GEN_ALL_CORE_FT
PRE-OP DIAGNOSIS:  Dysplastic nevus of skin 05-Oct-2023 11:12:52  iRck Deras  Lipoma of neck 05-Oct-2023 11:13:02  Rick Deras

## 2023-10-05 NOTE — BRIEF OPERATIVE NOTE - SPECIMENS
Enlarging soft tissue mass of the posterior left neck Enlarging soft tissue mass of the posterior left neck, and dysplastic nevus of upper mid back

## 2023-10-05 NOTE — BRIEF OPERATIVE NOTE - NSICDXBRIEFPROCEDURE_GEN_ALL_CORE_FT
PROCEDURES:  Adjacent tissue transfer of trunk, 10.0 to 30.0 square centimeters 05-Oct-2023 12:02:34  Arnaud Morris  
PROCEDURES:  Excision, neoplasm, malignant, back, 2.1 cm to 3.0 cm in diameter 05-Oct-2023 11:11:53  Rick Deras  Removal of lipoma of neck, 3 cm or greater 05-Oct-2023 11:12:39  Rick Deras

## 2023-10-05 NOTE — ASU DISCHARGE PLAN (ADULT/PEDIATRIC) - NURSING INSTRUCTIONS
You were given 1000mg IV Tylenol for pain management.  Please DO NOT take any Tylenol containing products, such as  Vicodin, Percocet, Excedrin, many cold preparations for the next 6 hours (until  5 PM).  DO NOT EXCEED 4000MG OF TYLENOL OVER 24 HOURS.

## 2023-10-13 ENCOUNTER — APPOINTMENT (OUTPATIENT)
Dept: PLASTIC SURGERY | Facility: CLINIC | Age: 63
End: 2023-10-13
Payer: COMMERCIAL

## 2023-10-13 VITALS
BODY MASS INDEX: 29.78 KG/M2 | OXYGEN SATURATION: 97 % | HEIGHT: 70 IN | WEIGHT: 208 LBS | DIASTOLIC BLOOD PRESSURE: 82 MMHG | SYSTOLIC BLOOD PRESSURE: 142 MMHG | HEART RATE: 78 BPM

## 2023-10-13 DIAGNOSIS — R22.1 LOCALIZED SWELLING, MASS AND LUMP, NECK: ICD-10-CM

## 2023-10-13 PROCEDURE — 99024 POSTOP FOLLOW-UP VISIT: CPT

## 2023-10-17 LAB
SURGICAL PATHOLOGY STUDY: SIGNIFICANT CHANGE UP

## 2023-10-18 PROBLEM — R22.1 MASS OF NECK: Status: ACTIVE | Noted: 2022-12-08

## 2023-10-31 ENCOUNTER — APPOINTMENT (OUTPATIENT)
Dept: PLASTIC SURGERY | Facility: CLINIC | Age: 63
End: 2023-10-31
Payer: COMMERCIAL

## 2023-10-31 VITALS
HEART RATE: 82 BPM | HEIGHT: 70 IN | DIASTOLIC BLOOD PRESSURE: 84 MMHG | BODY MASS INDEX: 29.78 KG/M2 | TEMPERATURE: 97.2 F | OXYGEN SATURATION: 98 % | SYSTOLIC BLOOD PRESSURE: 142 MMHG | WEIGHT: 208 LBS

## 2023-10-31 PROCEDURE — 99024 POSTOP FOLLOW-UP VISIT: CPT

## 2024-01-08 ENCOUNTER — APPOINTMENT (OUTPATIENT)
Dept: SURGICAL ONCOLOGY | Facility: CLINIC | Age: 64
End: 2024-01-08
Payer: COMMERCIAL

## 2024-01-08 VITALS
WEIGHT: 212 LBS | OXYGEN SATURATION: 96 % | RESPIRATION RATE: 16 BRPM | BODY MASS INDEX: 30.35 KG/M2 | HEIGHT: 70 IN | HEART RATE: 60 BPM | DIASTOLIC BLOOD PRESSURE: 81 MMHG | SYSTOLIC BLOOD PRESSURE: 125 MMHG

## 2024-01-08 DIAGNOSIS — C43.59 MALIGNANT MELANOMA OF OTHER PART OF TRUNK: ICD-10-CM

## 2024-01-08 PROCEDURE — 99214 OFFICE O/P EST MOD 30 MIN: CPT

## 2024-01-08 NOTE — REVIEW OF SYSTEMS
[Negative] : Heme/Lymph [FreeTextEntry7] : Gout [FreeTextEntry8] : GERD [de-identified] :  skin cancer

## 2024-01-08 NOTE — PHYSICAL EXAM
[Normal] : supple, no neck mass and thyroid not enlarged [Normal Neck Lymph Nodes] : normal neck lymph nodes  [Normal Supraclavicular Lymph Nodes] : normal supraclavicular lymph nodes [Normal Axillary Lymph Nodes] : normal axillary lymph nodes [Normal] : full range of motion and no deformities appreciated [de-identified] : Groins not examined [de-identified] : Below

## 2024-01-08 NOTE — ASSESSMENT
[FreeTextEntry1] : History of T3 melanoma of the right chest.  January 2023 CT scan of the chest at [facility] was unremarkable. Prescriptions entered for January 2024. Included a request for a right axillary sonogram since no sentinel drainage identified on scintigraphy.  I have asked her to call me a week after the imaging to discuss the results.  If asymptomatic, with normal imaging, we should see him in another year, sooner if needed.

## 2024-01-08 NOTE — HISTORY OF PRESENT ILLNESS
[de-identified] : 63-year-old man.  2023: 1.  Wide excision of a dysplastic nevus from the upper mid back. 2.  Resection of an enlarging soft tissue mass of the posterior neck. Plastics: Dr. Gage HERRERA. Surgical pathology: 1.  Upper mid back: No residual neoplasm, + scar from previous biopsy. 2.  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   CC: He is asymptomatic   +   Prior personal history: May 2018: Wide excision of a RIGHT CHEST 2.5 mm melanoma (mitotic index = 5), with negative margins, and primary closure.. NO SLN SEEN on preoperative scintigraphy.  2018, he had excision of a sebaceous cyst on the posterior Right neck.   During childhood he had several right lower extremity limb-lengthening procedures in [place], [facility], and then [place]. Shortly after his operation, there was wound breakdown associated right ankle scars which was treated at the wound care center in [place], with some progress.   May 2018 he was referred by his dermatologist Dr. Adiel BEY with a 2.5 mm melanoma of the right chest with a mitotic index of 5.  A preoperative CT scan of the chest, May 3, 2018, demonstrated no evidence of metastatic disease. 2018 follow-up CT chest at [facility]: STABLE bilateral tiny pulmonary nodules. May 2019: NO interval changes.  The primary lesion was asymptomatic pigmented lesion discovered on routine dermatologic surveillance.  No previous personal history of melanoma.  No prior personal history of skin cancer.  +FH: His father had melanoma. His father also had prostate cancer.  His mother  of ovarian cancer. There are no other relatives with history of malignancy.   Derm: Dr. Jd CLEMONS. Follow-up is scheduled for 2024.  Used to see Dr. Adiel Bey, then Dr. Rita Summers.   His internist is Dr. Mayito MADDOX.  He does not have a pacemaker or defibrillator. He does not take any anticoagulants.  + Gout. Treated by his internist, with allopurinol. He has not had to see a rheumatologist.  + Heartburn. He takes over-the-counter Nexium.   Eye examination: 2022 with Dr. Salazar was unremarkable.   Colonoscopy with Dr. Truong Rangel 2018, unremarkable. Dr. Rangel retired in the 2023. The patient will be scheduling a follow-up visit in , with 1 of Dr. Rangel's associates.

## 2024-01-08 NOTE — REASON FOR VISIT
[Follow-Up Visit] : a follow-up visit for [Other: _____] : [unfilled] [FreeTextEntry2] : Recent excision of dysplastic nevus on the upper mid back, resection of enlarging posterior neck mass, history of T3 melanoma of the right chest excised in May 2018.

## 2024-01-18 ENCOUNTER — APPOINTMENT (OUTPATIENT)
Dept: ULTRASOUND IMAGING | Facility: CLINIC | Age: 64
End: 2024-01-18
Payer: COMMERCIAL

## 2024-01-18 ENCOUNTER — APPOINTMENT (OUTPATIENT)
Dept: CT IMAGING | Facility: CLINIC | Age: 64
End: 2024-01-18
Payer: COMMERCIAL

## 2024-01-18 ENCOUNTER — OUTPATIENT (OUTPATIENT)
Dept: OUTPATIENT SERVICES | Facility: HOSPITAL | Age: 64
LOS: 1 days | End: 2024-01-18
Payer: COMMERCIAL

## 2024-01-18 DIAGNOSIS — M21.70 UNEQUAL LIMB LENGTH (ACQUIRED), UNSPECIFIED SITE: Chronic | ICD-10-CM

## 2024-01-18 DIAGNOSIS — Z90.49 ACQUIRED ABSENCE OF OTHER SPECIFIED PARTS OF DIGESTIVE TRACT: Chronic | ICD-10-CM

## 2024-01-18 DIAGNOSIS — Z98.890 OTHER SPECIFIED POSTPROCEDURAL STATES: Chronic | ICD-10-CM

## 2024-01-18 DIAGNOSIS — C43.9 MALIGNANT MELANOMA OF SKIN, UNSPECIFIED: Chronic | ICD-10-CM

## 2024-01-18 DIAGNOSIS — C43.59 MALIGNANT MELANOMA OF OTHER PART OF TRUNK: ICD-10-CM

## 2024-01-18 PROCEDURE — 71250 CT THORAX DX C-: CPT

## 2024-01-18 PROCEDURE — 76882 US LMTD JT/FCL EVL NVASC XTR: CPT

## 2024-01-18 PROCEDURE — 76882 US LMTD JT/FCL EVL NVASC XTR: CPT | Mod: 26,RT

## 2024-01-18 PROCEDURE — 71250 CT THORAX DX C-: CPT | Mod: 26

## 2025-01-16 ENCOUNTER — APPOINTMENT (OUTPATIENT)
Dept: SURGICAL ONCOLOGY | Facility: CLINIC | Age: 65
End: 2025-01-16
Payer: COMMERCIAL

## 2025-01-16 VITALS
BODY MASS INDEX: 30.06 KG/M2 | HEART RATE: 56 BPM | WEIGHT: 210 LBS | SYSTOLIC BLOOD PRESSURE: 133 MMHG | HEIGHT: 70 IN | DIASTOLIC BLOOD PRESSURE: 81 MMHG

## 2025-01-16 DIAGNOSIS — C43.59 MALIGNANT MELANOMA OF OTHER PART OF TRUNK: ICD-10-CM

## 2025-01-16 PROCEDURE — 99214 OFFICE O/P EST MOD 30 MIN: CPT

## 2025-01-17 ENCOUNTER — NON-APPOINTMENT (OUTPATIENT)
Age: 65
End: 2025-01-17

## 2025-02-12 ENCOUNTER — APPOINTMENT (OUTPATIENT)
Dept: CT IMAGING | Facility: CLINIC | Age: 65
End: 2025-02-12
Payer: COMMERCIAL

## 2025-02-12 ENCOUNTER — OUTPATIENT (OUTPATIENT)
Dept: OUTPATIENT SERVICES | Facility: HOSPITAL | Age: 65
LOS: 1 days | End: 2025-02-12
Payer: COMMERCIAL

## 2025-02-12 ENCOUNTER — APPOINTMENT (OUTPATIENT)
Dept: ULTRASOUND IMAGING | Facility: CLINIC | Age: 65
End: 2025-02-12
Payer: COMMERCIAL

## 2025-02-12 DIAGNOSIS — Z90.49 ACQUIRED ABSENCE OF OTHER SPECIFIED PARTS OF DIGESTIVE TRACT: Chronic | ICD-10-CM

## 2025-02-12 DIAGNOSIS — C43.9 MALIGNANT MELANOMA OF SKIN, UNSPECIFIED: Chronic | ICD-10-CM

## 2025-02-12 DIAGNOSIS — Z98.890 OTHER SPECIFIED POSTPROCEDURAL STATES: Chronic | ICD-10-CM

## 2025-02-12 DIAGNOSIS — Z00.8 ENCOUNTER FOR OTHER GENERAL EXAMINATION: ICD-10-CM

## 2025-02-12 DIAGNOSIS — M21.70 UNEQUAL LIMB LENGTH (ACQUIRED), UNSPECIFIED SITE: Chronic | ICD-10-CM

## 2025-02-12 PROCEDURE — 76882 US LMTD JT/FCL EVL NVASC XTR: CPT | Mod: 26,RT

## 2025-02-12 PROCEDURE — 71250 CT THORAX DX C-: CPT

## 2025-02-12 PROCEDURE — 71250 CT THORAX DX C-: CPT | Mod: 26

## 2025-02-12 PROCEDURE — 76882 US LMTD JT/FCL EVL NVASC XTR: CPT

## 2025-04-14 ENCOUNTER — APPOINTMENT (OUTPATIENT)
Dept: CT IMAGING | Facility: CLINIC | Age: 65
End: 2025-04-14

## 2025-04-15 NOTE — ASU PATIENT PROFILE, ADULT - AS SC BRADEN SENSORY
Caller: lorene Alan     Doctor:Aubrey     Reason for call: Awaiting Records - Pt had prior pain mgmt. Provided pt with office fax number to have records faxed over to us from prior pain mgmt,     Call back#: [phone]  
(4) no impairment

## 2025-05-29 ENCOUNTER — APPOINTMENT (OUTPATIENT)
Dept: CT IMAGING | Facility: CLINIC | Age: 65
End: 2025-05-29

## 2025-05-29 ENCOUNTER — OUTPATIENT (OUTPATIENT)
Dept: OUTPATIENT SERVICES | Facility: HOSPITAL | Age: 65
LOS: 1 days | End: 2025-05-29
Payer: COMMERCIAL

## 2025-05-29 DIAGNOSIS — C43.59 MALIGNANT MELANOMA OF OTHER PART OF TRUNK: ICD-10-CM

## 2025-05-29 DIAGNOSIS — Z98.890 OTHER SPECIFIED POSTPROCEDURAL STATES: Chronic | ICD-10-CM

## 2025-05-29 DIAGNOSIS — Z90.49 ACQUIRED ABSENCE OF OTHER SPECIFIED PARTS OF DIGESTIVE TRACT: Chronic | ICD-10-CM

## 2025-05-29 PROCEDURE — 71250 CT THORAX DX C-: CPT | Mod: 26

## (undated) DEVICE — WARMING BLANKET FULL UNDERBODY

## (undated) DEVICE — LABELS BLANK W PEN

## (undated) DEVICE — PACK MAJOR ABDOMINAL W ENDO DRAPE

## (undated) DEVICE — DRAPE SPLIT SHEET 77" X 108"

## (undated) DEVICE — SUT CHROMIC 4-0 27" SH

## (undated) DEVICE — STAPLER SKIN VISI-STAT 35 WIDE

## (undated) DEVICE — ELCTR GROUNDING PAD ADULT COVIDIEN

## (undated) DEVICE — DRSG XEROFORM 5 X 9"

## (undated) DEVICE — DRSG KLING 4"

## (undated) DEVICE — SPECIMEN CONTAINER 100ML

## (undated) DEVICE — LIJ-ZIMMER MESHGRAFTER: Type: DURABLE MEDICAL EQUIPMENT

## (undated) DEVICE — SOL IRR POUR NS 0.9% 1500ML

## (undated) DEVICE — SOL IRR POUR NS 0.9% 500ML

## (undated) DEVICE — SOL IRR POUR H2O 1500ML

## (undated) DEVICE — BLADE SCALPEL SAFETYLOCK #10

## (undated) DEVICE — DRAIN RESERVOIR FOR JACKSON PRATT 100CC CARDINAL

## (undated) DEVICE — GOWN TRIMAX LG

## (undated) DEVICE — CANISTER DISPOSABLE THIN WALL 3000CC

## (undated) DEVICE — GLV 8 PROTEXIS (WHITE)

## (undated) DEVICE — DRAPE 3/4 SHEET 52X76"

## (undated) DEVICE — WARMING BLANKET LOWER ADULT

## (undated) DEVICE — PACK MAJOR ABDOMINAL WITH LAP

## (undated) DEVICE — MARKING PEN W RULER

## (undated) DEVICE — DRAPE INSTRUMENT POUCH 6.75" X 11"

## (undated) DEVICE — ELCTR BOVIE TIP BLADE INSULATED 2.75" EDGE

## (undated) DEVICE — DRAPE SPLIT SHEET 77" X 120"

## (undated) DEVICE — DRAPE TOWEL BLUE 17" X 24"

## (undated) DEVICE — ELCTR BOVIE PENCIL SMOKE EVACUATION

## (undated) DEVICE — DRSG XEROFORM 1 X 8"

## (undated) DEVICE — DRAIN JACKSON PRATT 10MM FLAT FULL NO TROCAR

## (undated) DEVICE — DRAPE MAYO STAND 30"

## (undated) DEVICE — POSITIONER FOAM EGG CRATE ULNAR 2PCS (PINK)

## (undated) DEVICE — SOL IRR POUR H2O 250ML

## (undated) DEVICE — DRSG OPSITE 13.75 X 4"

## (undated) DEVICE — DRSG KERLIX ROLL 4.5"

## (undated) DEVICE — VENODYNE/SCD SLEEVE CALF MEDIUM

## (undated) DEVICE — MEDICATION LABELS W MARKER

## (undated) DEVICE — GLV 7.5 PROTEXIS (WHITE)

## (undated) DEVICE — BLADE SCALPEL SAFETYLOCK #15

## (undated) DEVICE — FOLEY TRAY 16FR 5CC LTX UMETER CLOSED

## (undated) DEVICE — DRSG STERISTRIPS 0.5 X 4"

## (undated) DEVICE — POSITIONER STRAP ARMBOARD VELCRO TS-30